# Patient Record
Sex: FEMALE | Race: WHITE | HISPANIC OR LATINO | Employment: FULL TIME | URBAN - METROPOLITAN AREA
[De-identification: names, ages, dates, MRNs, and addresses within clinical notes are randomized per-mention and may not be internally consistent; named-entity substitution may affect disease eponyms.]

---

## 2017-02-20 ENCOUNTER — APPOINTMENT (OUTPATIENT)
Dept: RADIOLOGY | Facility: MEDICAL CENTER | Age: 42
End: 2017-02-20
Attending: EMERGENCY MEDICINE

## 2017-02-20 ENCOUNTER — HOSPITAL ENCOUNTER (EMERGENCY)
Facility: MEDICAL CENTER | Age: 42
End: 2017-02-20
Attending: EMERGENCY MEDICINE
Payer: COMMERCIAL

## 2017-02-20 VITALS
HEIGHT: 63 IN | OXYGEN SATURATION: 98 % | WEIGHT: 167.55 LBS | TEMPERATURE: 97 F | RESPIRATION RATE: 16 BRPM | SYSTOLIC BLOOD PRESSURE: 139 MMHG | HEART RATE: 84 BPM | DIASTOLIC BLOOD PRESSURE: 93 MMHG | BODY MASS INDEX: 29.69 KG/M2

## 2017-02-20 DIAGNOSIS — S93.402A SPRAIN OF LEFT ANKLE, UNSPECIFIED LIGAMENT, INITIAL ENCOUNTER: ICD-10-CM

## 2017-02-20 PROCEDURE — 99026 IN-HOSPITAL ON CALL SERVICE: CPT | Performed by: INTERNAL MEDICINE

## 2017-02-20 PROCEDURE — 99284 EMERGENCY DEPT VISIT MOD MDM: CPT

## 2017-02-20 PROCEDURE — 73610 X-RAY EXAM OF ANKLE: CPT | Mod: LT

## 2017-02-20 RX ORDER — IBUPROFEN 600 MG/1
600 TABLET ORAL EVERY 6 HOURS PRN
Qty: 30 TAB | Refills: 0 | Status: SHIPPED | OUTPATIENT
Start: 2017-02-20 | End: 2024-02-01

## 2017-02-20 NOTE — ED AVS SNAPSHOT
2/20/2017          Fernando Gomes  [address] [place] [address]    Dear Ma Challenge:    [facility] wants to ensure your discharge home is safe and you or your loved ones have had all your questions answered regarding your care after you leave the hospital.    You may receive a telephone call within two days of your discharge.  This call is to make certain you understand your discharge instructions as well as ensure we provided you with the best care possible during your stay with us.     The call will only last approximately 3-5 minutes and will be done by a nurse.    Once again, we want to ensure your discharge home is safe and that you have a clear understanding of any next steps in your care.  If you have any questions or concerns, please do not hesitate to contact us, we are here for you.  Thank you for choosing [facility] for your healthcare needs.    Sincerely,    Jarett Lima    [facility]

## 2017-02-20 NOTE — ED AVS SNAPSHOT
Physicians Interactive Access Code: [email or web address]  Expires: 3/22/2017  6:03 PM    Your email address is not on file at [facility] [facility].  Email Addresses are required for you to sign up for Physicians Interactive, please contact [phone] to verify your personal information and to provide your email address prior to attempting to register for Physicians Interactive.    Fernando Gomes  [address] [place] [address]    My True Fitt  A secure, online tool to manage your health information     [employer]’s Physicians Interactive® is a secure, online tool that connects you to your personalized health information from the privacy of your home -- day or night - making it very easy for you to manage your healthcare. Once the activation process is completed, you can even access your medical information using the Physicians Interactive biju, which is available for free in the Apple Biju store or Google Play store.     To learn more about Physicians Interactive, visit [email or web address]    There are two levels of access available (as shown below):   My Chart Features  [facility] Primary Care Doctor [facility]  Specialists [facility]  Urgent  Care Non-[facility] Primary Care Doctor   Email your healthcare team securely and privately 24/7 X X X    Manage appointments: schedule your next appointment; view details of past/upcoming appointments X      Request prescription refills. X      View recent personal medical records, including lab and immunizations X X X X   View health record, including health history, allergies, medications X X X X   Read reports about your outpatient visits, procedures, consult and ER notes X X X X   See your discharge summary, which is a recap of your hospital and/or ER visit that includes your diagnosis, lab results, and care plan X X  X     How to register for My True Fitt:  Once your e-mail address has been verified, follow the following steps to sign up for My True Fitt.     1. Go to  [email or web address]  2. Click on the Sign Up Now box, which takes you to the New Member Sign Up page. You  will need to provide the following information:  a. Enter your [employer] Access Code exactly as it appears at the top of this page. (You will not need to use this code after you’ve completed the sign-up process. If you do not sign up before the expiration date, you must request a new code.)   b. Enter your date of birth.   c. Enter your home email address.   d. Click Submit, and follow the next screen’s instructions.  3. Create a [employer] Mediat ID. This will be your Xiaoi Robert login ID and cannot be changed, so think of one that is secure and easy to remember.  4. Create a Xiaoi Robert password. You can change your password at any time.  5. Enter your Password Reset Question and Answer. This can be used at a later time if you forget your password.   6. Enter your e-mail address. This allows you to receive e-mail notifications when new information is available in Xiaoi Robert.  7. Click Sign Up. You can now view your health information.    For assistance activating your Xiaoi Robert account, call [phone]

## 2017-02-20 NOTE — LETTER
"  FORM C-4:  EMPLOYEE’S CLAIM FOR COMPENSATION/ REPORT OF INITIAL TREATMENT  EMPLOYEE’S CLAIM - PROVIDE ALL INFORMATION REQUESTED   First Name  Fernando Zuñiga Last Name  David Gomes Birthdate             Age  1975 41 y.o. 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 [facility] [address]] Zip  [address]   Date of Injury  2/20/2017       Hour of Injury  3:15 PM Date Employer Notified  2/20/2017 Last Day of Work after Injury or Occupational Disease  2/20/2017 Supervisor to Whom Injury Reported  Bandar   Address or Location of Accident (if applicable)  Stairs   What were you doing at the time of accident? (if applicable)  Going down the stairs    How did this injury or occupational disease occur? Be specific and answer in detail. Use additional sheet if necessary)  Weakness on Left Foot while going down the stairs   If you believe that you have an occupational disease, when did you first have knowledge of the disability and it relationship to your employment?  NA Witnesses to the Accident  Two Ladies from Work, but dont know their names     Nature of Injury or Occupational Disease  Contusion  Part(s) of Body Injured or Affected  Ankle (L), Defer, Defer    I certify that the above is true and correct to the best of my knowledge and that I have provided this information in order to obtain the " benefits of [place]  I hereby authorize any physician, chiropractor, surgeon, practitioner, or other person, any hospital, including [facility] or [facility] hospital, any medical service organization, any insurance company, or other institution or organization to release to each other, any medical or other information, including benefits paid or payable, pertinent to this injury or disease, except information relative to diagnosis, treatment and/or counseling for AIDS, psychological conditions, alcohol or controlled substances, for which I must give specific authorization.  A Photostat of this authorization shall be as valid as the original.   Date  02/20/2017 Place  [facility] Employee’s Signature   THIS REPORT MUST BE COMPLETED AND MAILED WITHIN 3 WORKING DAYS OF TREATMENT   Place  [facility], EMERGENCY DEPT  Name of Facility   [facility]   Date  2/20/2017 Diagnosis  No diagnosis found. Is there evidence the injured employee was under the influence of alcohol and/or another controlled substance at the time of accident?   Hour  5:01 PM Description of Injury or Disease   No   Treatment  Crutches, ankle air splint, Motrin, ice  Have you advised the patient to remain off work five days or more?         No   X-Ray Findings  Negative  Comments:negative for acute fracture or dislocation   If Yes   From Date    To Date      From information given by the employee, together with medical evidence, can you directly connect this injury or occupational disease as job incurred?  Yes If No, is the employee capable of: Full Duty  No Modified Duty  Yes   Is additional medical care by a physician indicated?  Yes  Comments:Workmen's Compensation, orthopedics If Modified Duty, Specify any Limitations / Restrictions  No walking     Do you know of any previous injury or  "disease contributing to this condition or occupational disease?  No   Date  2/20/2017 Print Doctor’s Name  Abhi Damon certify the employer’s copy of this form was mailed on:   Address  [address] [address] [phone] Insurer’s Use Only   [facility]  [address]    Provider’s Tax ID Number  [id number] Telephone  Dept: [phone]    Doctor’s Signature  e-ABHI Ovalle D.O. Degree   D.O.    Original - TREATING PHYSICIAN OR CHIROPRACTOR   Pg 2-Insurer/TPA   Pg 3-Employer   Pg 4-Employee                                                                                                  Form C-4 (rev01/03)     BRIEF DESCRIPTION OF RIGHTS AND BENEFITS  (Pursuant to [place] 616C.050)    Notice of Injury or Occupational Disease (Incident Report Form C-1): If an injury or occupational disease (OD) arises out of and in the course of employment, you must provide written notice to your employer as soon as practicable, but no later than 7 days after the accident or OD. Your employer shall maintain a sufficient supply of the required forms.    Claim for Compensation (Form C-4): If medical treatment is sought, the form C-4 is available at the place of initial treatment. A completed \"Claim for Compensation\" (Form C-4) must be filed within 90 days after an accident or OD. The treating physician or chiropractor must, within 3 working days after treatment, complete and mail to the employer, the employer's insurer and third-party , the Claim for Compensation.    Medical Treatment: If you require medical treatment for your on-the-job injury or OD, you may be required to select a physician or chiropractor from a list provided by your workers’ compensation insurer, if it has contracted with an Organization for Managed Care (MCO) or Preferred Provider Organization (PPO) or providers of health care. If your employer has not entered into a contract with an MCO or PPO, you " may select a physician or chiropractor from the Panel of Physicians and Chiropractors. Any medical costs related to your industrial injury or OD will be paid by your insurer.    Temporary Total Disability (TTD): If your doctor has certified that you are unable to work for a period of at least 5 consecutive days, or 5 cumulative days in a 20-day period, or places restrictions on you that your employer does not accommodate, you may be entitled to TTD compensation.    Temporary Partial Disability (TPD): If the wage you receive upon reemployment is less than the compensation for TTD to which you are entitled, the insurer may be required to pay you TPD compensation to make up the difference. TPD can only be paid for a maximum of 24 months.    Permanent Partial Disability (PPD): When your medical condition is stable and there is an indication of a PPD as a result of your injury or OD, within 30 days, your insurer must arrange for an evaluation by a rating physician or chiropractor to determine the degree of your PPD. The amount of your PPD award depends on the date of injury, the results of the PPD evaluation and your age and wage.    Permanent Total Disability (PTD): If you are medically certified by a treating physician or chiropractor as permanently and totally disabled and have been granted a PTD status by your insurer, you are entitled to receive monthly benefits not to exceed 66 2/3% of your average monthly wage. The amount of your PTD payments is subject to reduction if you previously received a PPD award.    Vocational Rehabilitation Services: You may be eligible for vocational rehabilitation services if you are unable to return to the job due to a permanent physical impairment or permanent restrictions as a result of your injury or occupational disease.    Transportation and Per Sohan Reimbursement: You may be eligible for travel expenses and per sohan associated with medical treatment.  Reopening: You may be able  to reopen your claim if your condition worsens after claim closure.    Appeal Process: If you disagree with a written determination issued by the insurer or the insurer does not respond to your request, you may appeal to the Department of Administration, , by following the instructions contained in your determination letter. You must appeal the determination within 70 days from the date of the determination letter at [address], [address], [place], [place] [address], or [address] [facility] [facility], [place], [place] [address]. If you disagree with the  decision, you may appeal to the Department of Administration, . You must file your appeal within 30 days from the date of the  decision letter at [address], [address], [place], [place] [address], or [address] [facility], [place], [place] [address]. If you disagree with a decision of an , you may file a petition for judicial review with the District Court. You must do so within 30 days of the Appeal Officer’s decision. You may be represented by an  at your own expense or you may contact the [facility] for possible representation.    [place]  for Injured Workers (NAIW): If you disagree with a  decision, you may request that NAIW represent you without charge at an  Hearing. For information regarding denial of benefits, you may contact the [facility] at: [address], [address], [place] [address], [phone], or [address], [place] [address], [phone]    To File a Complaint with the Division: If you wish to file a complaint with the  of the Division of Industrial Relations (DIR), please contact the Workers’ Compensation Section, [address], [address], [place], [place] [address], telephone [phone]) [phone], or [address], [place]  [address], telephone [phone].    For assistance with Workers’ Compensation Issues: you may contact the Office of the Governor Consumer Health Assistance, [address] [place] [address], [address], [place] [address], Toll Free 1-587.365.4130, Web site: http://[employer].[facility].nv., E-mail [email or web address].                                                                                                                                                                               __________________________________________________________________                                    ______02/20/017_____            Employee Name / Signature                                                                                                                            Date                                       D-2 (rev. 10/07)

## 2017-02-20 NOTE — ED NOTES
Pt bib [facility]. Pt was at work when she fell down 3 steps and rolled her left ankle. No deformity noted. Pt reports pain and tenderness. Pt in NAD. VSS

## 2017-02-20 NOTE — ED AVS SNAPSHOT
Home Care Instructions                                                                                                                Fernando Gomes   MRN: [id number]    Department:  [facility], Emergency Dept   Date of Visit:  2/20/2017            [facility], Emergency Dept    [address] [address]    Phone:  [phone]      You were seen by     Tk Damon D.O.      Your Diagnosis Was     Sprain of left ankle, unspecified ligament, initial encounter     S93.402A       Follow-up Information     1. Follow up with [facility], Emergency Dept.    Specialty:  Emergency Medicine    Why:  If symptoms worsen    Contact information    [address] [address] [phone]        2. Schedule an appointment as soon as possible for a visit with Spenser Mendoza M.D..    Specialty:  Orthopaedics    Why:  As needed    Contact information    [address]  [facility] [address] [phone]          3. Follow up with [facility]. Schedule an appointment as soon as possible for a visit in 1 day.    Contact information    [address] [facility] [place] [address] [phone]        Medication Information     Review all of your home medications and newly ordered medications with your primary doctor and/or pharmacist as soon as possible. Follow medication instructions as directed by your doctor and/or pharmacist.     Please keep your complete medication list with you and share with your physician. Update the information when medications are discontinued, doses are changed, or new medications (including over-the-counter products) are added; and carry medication information at all times in the event of emergency situations.               Medication List      START taking these medications        Instructions    ibuprofen 600 MG Tabs   Commonly known as:  MOTRIN    Take 1 Tab by mouth every 6 hours as needed for Mild Pain.      Dose:  600 mg               Procedures and tests performed during your visit     DX-ANKLE 3+ VIEWS LEFT        Discharge Instructions       Esguince de tobillo  (Ankle Sprain)   Un esguince de tobillo es fiordaliza lesión en los tejidos shyanne y fibrosos (ligamentos) que mantienen unidos los huesos de la articulación del tobillo.   CAUSAS   Las causas pueden ser fiordaliza caída o la torcedura del tobillo. Los esguinces de tobillo ocurren con más frecuencia al pisar con el borde exterior del pie, lo que hace que el tobillo se vuelva hacia adentro. Las personas que practican deportes son más propensas a ely tipo de lesiones.   SÍNTOMAS   · Dolor en el tobillo. El dolor puede aparecer bill el reposo o sólo al tratar de ponerse de pie o caminar.  · Hinchazón.  · Hematomas. Los hematomas pueden aparecer inmediatamente o luego de 1 a 2 días después de la lesión.  · Dificultad para pararse o caminar, especialmente al doblar en esquinas o al cambiar de dirección.  DIAGNÓSTICO   El médico le preguntará detalles acerca de la lesión y le hará un examen físico del tobillo para determinar si tiene un esguince. Bill el examen físico, el médico apretará y aplicará presión en áreas específicas del pie y del tobillo. El médico tratará de  el tobillo en ciertas direcciones. Le indicarán fiordaliza radiografía para descartar la fractura de un hueso o que un ligamento no se haya separado de brice de los huesos del tobillo (fractura por avulsión).   TRATAMIENTO   Algunos tipos de soporte podrán ayudarlo a estabilizar el tobillo. El profesional que lo asiste le dará las indicaciones. También podrá indicarle que use medicamentos para calmar el dolor. Si el esguince es grave, novoa médico podrá derivarlo a un cirujano que lo ayudará a recuperar la función de las partes afectadas del sistema esquelético (ortopedista) o a un fisioterapeuta.   INSTRUCCIONES PARA EL CUIDADO EN EL HOGAR   · Aplique hielo en la articulación lesionada bill 1 ó 2 días o  según lo que le indique novoa médico. La aplicación del hielo ayuda a reducir la inflamación y el dolor.  ¨ Ponga el hielo en fiordaliza bolsa plástica.  ¨ Colóquese fiordaliza toalla entre la piel y la bolsa de hielo.  ¨ Deje el hielo en el lugar bill 15 a 20 minutos por vez, cada 2 horas mientras esté despierto.  · Sólo tome medicamentos de venta dixie o recetados para calmar el dolor, las molestias o bajar la fiebre según las indicaciones de novoa médico.  · Eleve el tobillo lesionado por encima del nivel del corazón tanto alissa pueda bill 2 o 3 días.  · Si novoa médico le indica el uso de muletas, úselas según las instrucciones. Gradualmente lleve el peso sobre el tobillo afectado. Siga usando muletas o un bastón hasta que pueda caminar sin sentir dolor en el tobillo.  · Si tiene fiordaliza férula de yeso, úsela alissa lo indique novoa médico. No se apoye en ninguna cosa más dura que fiordaliza almohada bill las primeras 24 horas. No ponga peso sobre la férula. No permita que se moje. Puede quitársela para sue fiordaliza ducha o un baño.  · Pueden haberle colocado un vendaje elástico para usar alrededor del tobillo para darle soporte. Si el vendaje elástico está muy ajustado (siente adormecimiento u hormigueo o el pie está frío y alex), ajústelo para que sea más cómodo.  · Si usted tiene fiordaliza férula de aire, puede soplar o dejar salir el aire para que sea más cómodo. Puede quitarse la férula por la noche y antes de sue fiordaliza ducha o un baño. Mueva los dedos de los pies en la férula varias veces al día para disminuir la hinchazón.  SOLICITE ATENCIÓN MÉDICA SI:   · Le aumenta rápidamente el moretón o el hinchazón.  · Los dedos de los pies están extremadamente fríos o pierde la sensibilidad en el pie.  · El dolor no se silvino con los medicamentos.  SOLICITE ATENCIÓN MÉDICA DE INMEDIATO SI:   · Los dedos de los pies están adormecidos o de color alex.  · Tiene un dolor pema que va aumentando.  ASEGÚRESE DE QUE:   · Comprende estas  instrucciones.  · Controlará novoa enfermedad.  · Solicitará ayuda de inmediato si no mejora o empeora.     Esta información no tiene alissa fin reemplazar el consejo del médico. Asegúrese de hacerle al médico cualquier pregunta que tenga.     Document Released: 12/18/2006 Document Revised: 09/11/2013  [employer] Interactive Patient Education ©2016 [employer] Inc.            Patient Information     Patient Information    Following emergency treatment: all patient requiring follow-up care must return either to a private physician or a clinic if your condition worsens before you are able to obtain further medical attention, please return to the emergency room.     Billing Information    At [facility], we work to make the billing process streamlined for our patients.  Our Representatives are here to answer any questions you may have regarding your hospital bill.  If you have insurance coverage and have supplied your insurance information to us, we will submit a claim to your insurer on your behalf.  Should you have any questions regarding your bill, we can be reached online or by phone as follows:  Online: You are able pay your bills online or live chat with our representatives about any billing questions you may have. We are here to help Monday - Friday from 8:00am to 7:30pm and 9:00am - 12:00pm on Saturdays.  Please visit [email or web address]  for more information.   Phone:  [phone] or [phone]    Please note that your emergency physician, surgeon, pathologist, radiologist, anesthesiologist, and other specialists are not employed by [facility] and will therefore bill separately for their services.  Please contact them directly for any questions concerning their bills at the numbers below:     Emergency Physician Services:  [phone]  [facility]:  [phone]  [facility]:  [phone]  [facility]:  [phone]    1. Your final  bill may vary from the amount quoted upon discharge if all procedures are not complete at that time, or if your doctor has additional procedures of which we are not aware. You will receive an additional bill if you return to the Emergency Department at [facility] for suture removal regardless of the facility of which the sutures were placed.     2. Please arrange for settlement of this account at the emergency registration.    3. All self-pay accounts are due in full at the time of treatment.  If you are unable to meet this obligation then payment is expected within 4-5 days.     4. If you have had radiology studies (CT, X-ray, Ultrasound, MRI), you have received a preliminary result during your emergency department visit. Please contact the radiology department [phone] to receive a copy of your final result. Please discuss the Final result with your primary physician or with the follow up physician provided.     Crisis Hotline:  [place] Crisis Hotline:  5-248-ZWUOFWY or 1-715.817.6971  [place] Crisis Hotline:    1-360.719.1927 or 018-834-9805         ED Discharge Follow Up Questions    1. In order to provide you with very good care, we would like to follow up with a phone call in the next few days.  May we have your permission to contact you?     YES /  NO    2. What is the best phone number to call you? (       )_____-__________    3. What is the best time to call you?      Morning  /  Afternoon  /  Evening                   Patient Signature:  ____________________________________________________________    Date:  ____________________________________________________________

## 2017-02-21 ENCOUNTER — NON-PROVIDER VISIT (OUTPATIENT)
Dept: OCCUPATIONAL MEDICINE | Facility: CLINIC | Age: 42
End: 2017-02-21
Payer: COMMERCIAL

## 2017-02-21 ENCOUNTER — OCCUPATIONAL MEDICINE (OUTPATIENT)
Dept: OCCUPATIONAL MEDICINE | Facility: CLINIC | Age: 42
End: 2017-02-21
Payer: COMMERCIAL

## 2017-02-21 VITALS
TEMPERATURE: 97 F | SYSTOLIC BLOOD PRESSURE: 130 MMHG | RESPIRATION RATE: 16 BRPM | DIASTOLIC BLOOD PRESSURE: 84 MMHG | BODY MASS INDEX: 28.6 KG/M2 | WEIGHT: 167.55 LBS | OXYGEN SATURATION: 97 % | HEIGHT: 64 IN | HEART RATE: 77 BPM

## 2017-02-21 DIAGNOSIS — S93.402D SPRAIN OF LEFT ANKLE, UNSPECIFIED LIGAMENT, SUBSEQUENT ENCOUNTER: ICD-10-CM

## 2017-02-21 DIAGNOSIS — Z02.1 PRE-EMPLOYMENT DRUG SCREENING: ICD-10-CM

## 2017-02-21 DIAGNOSIS — Z02.83 ENCOUNTER FOR DRUG SCREENING: ICD-10-CM

## 2017-02-21 PROCEDURE — 80305 DRUG TEST PRSMV DIR OPT OBS: CPT | Performed by: INTERNAL MEDICINE

## 2017-02-21 PROCEDURE — 82075 ASSAY OF BREATH ETHANOL: CPT | Performed by: INTERNAL MEDICINE

## 2017-02-21 PROCEDURE — 99201 PR OFFICE/OUTPT VISIT,NEW,LEVL I: CPT | Performed by: PREVENTIVE MEDICINE

## 2017-02-21 NOTE — MR AVS SNAPSHOT
"        Fernando Gomes   2017 4:10 PM   Occupational Medicine   MRN: [id number]    Department:  [facility]   Dept Phone:  [phone]    Description:  Female : 1975   Provider:  Benigno Garcia M.D.           Reason for Visit     Follow-Up WC DOI 17 L ankle same RM 25      Allergies as of 2017     No Known Allergies      You were diagnosed with     Sprain of left ankle, unspecified ligament, subsequent encounter   [[id number]]         Vital Signs     Blood Pressure Pulse Temperature Respirations Height Weight    130/84 mmHg 77 36.1 °C (97 °F) 16 1.62 m (5' 3.78\") 76 kg (167 lb 8.8 oz)    Body Mass Index Oxygen Saturation Last Menstrual Period Breastfeeding? Smoking Status       28.96 kg/m2 97% 2017 (Exact Date) No Never Smoker        Basic Information     Date Of Birth Sex Race Ethnicity Preferred Language Language for Written Material    1975 Female White  Origin (Turkmen,Macedonian,Serbian,Kofi, etc) English Turkmen      Your appointments     2017  3:40 PM   Workers Compensation with Benigno Garcia M.D.   [address]  [address] [place] [address] [phone]              Health Maintenance     Patient has no pending health maintenance at this time      Current Immunizations     No immunizations on file.      Below and/or attached are the medications your provider expects you to take. Review all of your home medications and newly ordered medications with your provider and/or pharmacist. Follow medication instructions as directed by your provider and/or pharmacist. Please keep your medication list with you and share with your provider. Update the information when medications are discontinued, doses are changed, or new medications (including over-the-counter products) are added; and carry medication information at all times in the event of emergency situations     Allergies:  No Known Allergies "          Medications  Valid as of: February 21, 2017 -  4:19 PM    Generic Name Brand Name Tablet Size Instructions for use    Ibuprofen (Tab) MOTRIN 600 MG Take 1 Tab by mouth every 6 hours as needed for Mild Pain.        Ibuprofen (Tab) MOTRIN 600 MG Take 1 Tab by mouth every 6 hours as needed.        .                 Medicines prescribed today were sent to:     [facility][id number] - [place], [place] - [address]    [address] [place] [address]    Phone: [phone] Fax: [phone]    Open 24 Hours?: No      Medication refill instructions:       If your prescription bottle indicates you have medication refills left, it is not necessary to call your provider’s office. Please contact your pharmacy and they will refill your medication.    If your prescription bottle indicates you do not have any refills left, you may request refills at any time through one of the following ways: The online AngioSlide system (except Urgent Care), by calling your provider’s office, or by asking your pharmacy to contact your provider’s office with a refill request. Medication refills are processed only during regular business hours and may not be available until the next business day. Your provider may request additional information or to have a follow-up visit with you prior to refilling your medication.   *Please Note: Medication refills are assigned a new Rx number when refilled electronically. Your pharmacy may indicate that no refills were authorized even though a new prescription for the same medication is available at the pharmacy. Please request the medicine by name with the pharmacy before contacting your provider for a refill.           AngioSlide Access Code: [email or web address]  Expires: 3/22/2017  6:03 PM    AngioSlide  A secure, online tool to manage your health information     [employer]’s AngioSlide® is a secure, online tool that connects you to your personalized health information from the privacy of your home -- day or night -  making it very easy for you to manage your healthcare. Once the activation process is completed, you can even access your medical information using the [employer] biju, which is available for free in the Apple Biju store or Google Play store.     [employer] provides the following levels of access (as shown below):   My Chart Features   [facility] Primary Care Doctor [facility]  Specialists [facility]  Urgent  Care Non-[facility]  Primary Care  Doctor   Email your healthcare team securely and privately 24/7 X X X    Manage appointments: schedule your next appointment; view details of past/upcoming appointments X      Request prescription refills. X      View recent personal medical records, including lab and immunizations X X X X   View health record, including health history, allergies, medications X X X X   Read reports about your outpatient visits, procedures, consult and ER notes X X X X   See your discharge summary, which is a recap of your hospital and/or ER visit that includes your diagnosis, lab results, and care plan. X X       How to register for [employer]:  1. Go to  [email or web address].  2. Click on the Sign Up Now box, which takes you to the New Member Sign Up page. You will need to provide the following information:  a. Enter your [employer] Access Code exactly as it appears at the top of this page. (You will not need to use this code after you’ve completed the sign-up process. If you do not sign up before the expiration date, you must request a new code.)   b. Enter your date of birth.   c. Enter your home email address.   d. Click Submit, and follow the next screen’s instructions.  3. Create a [employer] ID. This will be your [employer] login ID and cannot be changed, so think of one that is secure and easy to remember.  4. Create a [employer] password. You can change your password at any time.  5. Enter your Password Reset Question and Answer. This can be used at a later time if you forget your password.   6. Enter your e-mail  address. This allows you to receive e-mail notifications when new information is available in [employer].  7. Click Sign Up. You can now view your health information.    For assistance activating your [employer] account, call [phone]

## 2017-02-21 NOTE — DISCHARGE INSTRUCTIONS
Esguince de tobillo  (Ankle Sprain)   Un esguince de tobillo es fiordaliza lesión en los tejidos shyanne y fibrosos (ligamentos) que mantienen unidos los huesos de la articulación del tobillo.   CAUSAS   Las causas pueden ser fiordaliza caída o la torcedura del tobillo. Los esguinces de tobillo ocurren con más frecuencia al pisar con el borde exterior del pie, lo que hace que el tobillo se vuelva hacia adentro. Las personas que practican deportes son más propensas a ely tipo de lesiones.   SÍNTOMAS   · Dolor en el tobillo. El dolor puede aparecer bill el reposo o sólo al tratar de ponerse de pie o caminar.  · Hinchazón.  · Hematomas. Los hematomas pueden aparecer inmediatamente o luego de 1 a 2 días después de la lesión.  · Dificultad para pararse o caminar, especialmente al doblar en esquinas o al cambiar de dirección.  DIAGNÓSTICO   El médico le preguntará detalles acerca de la lesión y le hará un examen físico del tobillo para determinar si tiene un esguince. Bill el examen físico, el médico apretará y aplicará presión en áreas específicas del pie y del tobillo. El médico tratará de  el tobillo en ciertas direcciones. Le indicarán fiordaliza radiografía para descartar la fractura de un hueso o que un ligamento no se haya separado de brice de los huesos del tobillo (fractura por avulsión).   TRATAMIENTO   Algunos tipos de soporte podrán ayudarlo a estabilizar el tobillo. El profesional que lo asiste le dará las indicaciones. También podrá indicarle que use medicamentos para calmar el dolor. Si el esguince es grave, novoa médico podrá derivarlo a un cirujano que lo ayudará a recuperar la función de las partes afectadas del sistema esquelético (ortopedista) o a un fisioterapeuta.   INSTRUCCIONES PARA EL CUIDADO EN EL HOGAR   · Aplique hielo en la articulación lesionada bill 1 ó 2 días o según lo que le indique novoa médico. La aplicación del hielo ayuda a reducir la inflamación y el dolor.  ¨ Ponga el hielo en fiordaliza bolsa  plástica.  ¨ Colóquese fiordaliza toalla entre la piel y la bolsa de hielo.  ¨ Deje el hielo en el lugar bill 15 a 20 minutos por vez, cada 2 horas mientras esté despierto.  · Sólo tome medicamentos de venta dixie o recetados para calmar el dolor, las molestias o bajar la fiebre según las indicaciones de novoa médico.  · Eleve el tobillo lesionado por encima del nivel del corazón tanto alissa pueda bill 2 o 3 días.  · Si novoa médico le indica el uso de muletas, úselas según las instrucciones. Gradualmente lleve el peso sobre el tobillo afectado. Siga usando muletas o un bastón hasta que pueda caminar sin sentir dolor en el tobillo.  · Si tiene fiordaliza férula de yeso, úsela alissa lo indique novoa médico. No se apoye en ninguna cosa más dura que fiordaliza almohada bill las primeras 24 horas. No ponga peso sobre la férula. No permita que se moje. Puede quitársela para sue fiordaliza ducha o un baño.  · Pueden haberle colocado un vendaje elástico para usar alrededor del tobillo para darle soporte. Si el vendaje elástico está muy ajustado (siente adormecimiento u hormigueo o el pie está frío y alex), ajústelo para que sea más cómodo.  · Si usted tiene fiordaliza férula de aire, puede soplar o dejar salir el aire para que sea más cómodo. Puede quitarse la férula por la noche y antes de sue fiordaliza ducha o un baño. Mueva los dedos de los pies en la férula varias veces al día para disminuir la hinchazón.  SOLICITE ATENCIÓN MÉDICA SI:   · Le aumenta rápidamente el moretón o el hinchazón.  · Los dedos de los pies están extremadamente fríos o pierde la sensibilidad en el pie.  · El dolor no se silvino con los medicamentos.  SOLICITE ATENCIÓN MÉDICA DE INMEDIATO SI:   · Los dedos de los pies están adormecidos o de color alex.  · Tiene un dolor pema que va aumentando.  ASEGÚRESE DE QUE:   · Comprende estas instrucciones.  · Controlará novoa enfermedad.  · Solicitará ayuda de inmediato si no mejora o empeora.     Esta información no tiene alissa fin reemplazar el  consejo del médico. Asegúrese de hacerle al médico cualquier pregunta que tenga.     Document Released: 12/18/2006 Document Revised: 09/11/2013  Elsevier Interactive Patient Education ©2016 Elsevier Inc.

## 2017-02-21 NOTE — MR AVS SNAPSHOT
Ma Zara Gomes   2017 8:30 AM   Appointment   MRN: [id number]    Department:  [facility]   Dept Phone:  [phone]    Description:  Female : 1975   Provider:  OH NON RENOWN MA           Allergies as of 2017     No Known Allergies      Vital Signs     Last Menstrual Period Smoking Status                2017 (Exact Date) Never Smoker           Basic Information     Date Of Birth Sex Race Ethnicity Preferred Language Language for Written Material    1975 Female White  Origin (Irish,Afghan,South Korean,Ethiopian, etc) English Irish      Your appointments     2017  3:40 PM   Workers Compensation with Benigno Garcia M.D.   [facility] ([address]  [facility] [address] [phone]              Health Maintenance        Date Due Completion Dates    IMM DTaP/Tdap/Td Vaccine (1 - Tdap) 1995 ---    PAP SMEAR 1997 ---    MAMMOGRAM 2016 ---    IMM INFLUENZA (1) 2016 ---            Current Immunizations     No immunizations on file.      Below and/or attached are the medications your provider expects you to take. Review all of your home medications and newly ordered medications with your provider and/or pharmacist. Follow medication instructions as directed by your provider and/or pharmacist. Please keep your medication list with you and share with your provider. Update the information when medications are discontinued, doses are changed, or new medications (including over-the-counter products) are added; and carry medication information at all times in the event of emergency situations     Allergies:  No Known Allergies          Medications  Valid as of: 2017 -  2:12 PM    Generic Name Brand Name Tablet Size Instructions for use    Ibuprofen (Tab) MOTRIN 600 MG Take 1 Tab by mouth every 6 hours as needed for Mild Pain.        Ibuprofen (Tab) MOTRIN 600 MG Take 1 Tab by mouth  every 6 hours as needed.        .                 Medicines prescribed today were sent to:     [facility]/PHARMACY #[id number] - [place], [place] - [address]    [address] [facility] [place] [address]    Phone: [phone] Fax: [phone]    Open 24 Hours?: No      Medication refill instructions:       If your prescription bottle indicates you have medication refills left, it is not necessary to call your provider’s office. Please contact your pharmacy and they will refill your medication.    If your prescription bottle indicates you do not have any refills left, you may request refills at any time through one of the following ways: The online Strategic Blue system (except Urgent Care), by calling your provider’s office, or by asking your pharmacy to contact your provider’s office with a refill request. Medication refills are processed only during regular business hours and may not be available until the next business day. Your provider may request additional information or to have a follow-up visit with you prior to refilling your medication.   *Please Note: Medication refills are assigned a new Rx number when refilled electronically. Your pharmacy may indicate that no refills were authorized even though a new prescription for the same medication is available at the pharmacy. Please request the medicine by name with the pharmacy before contacting your provider for a refill.           Strategic Blue Access Code: [email or web address]  Expires: 3/22/2017  6:03 PM    Strategic Blue  A secure, online tool to manage your health information     [employer]’s Strategic Blue® is a secure, online tool that connects you to your personalized health information from the privacy of your home -- day or night - making it very easy for you to manage your healthcare. Once the activation process is completed, you can even access your medical information using the Strategic Blue biju, which is available for free in the Apple Biju store or Google Play store.     Strategic Blue provides the following  levels of access (as shown below):   My Chart Features   [facility] Primary Care Doctor [facility]  Specialists [facility]  Urgent  Care Non-[facility]  Primary Care  Doctor   Email your healthcare team securely and privately 24/7 X X X    Manage appointments: schedule your next appointment; view details of past/upcoming appointments X      Request prescription refills. X      View recent personal medical records, including lab and immunizations X X X X   View health record, including health history, allergies, medications X X X X   Read reports about your outpatient visits, procedures, consult and ER notes X X X X   See your discharge summary, which is a recap of your hospital and/or ER visit that includes your diagnosis, lab results, and care plan. X X       How to register for Efizity:  1. Go to  [email or web address].  2. Click on the Sign Up Now box, which takes you to the New Member Sign Up page. You will need to provide the following information:  a. Enter your Efizity Access Code exactly as it appears at the top of this page. (You will not need to use this code after you’ve completed the sign-up process. If you do not sign up before the expiration date, you must request a new code.)   b. Enter your date of birth.   c. Enter your home email address.   d. Click Submit, and follow the next screen’s instructions.  3. Create a Efizity ID. This will be your Efizity login ID and cannot be changed, so think of one that is secure and easy to remember.  4. Create a Efizity password. You can change your password at any time.  5. Enter your Password Reset Question and Answer. This can be used at a later time if you forget your password.   6. Enter your e-mail address. This allows you to receive e-mail notifications when new information is available in Efizity.  7. Click Sign Up. You can now view your health information.    For assistance activating your Efizity account, call [phone]

## 2017-02-21 NOTE — Clinical Note
[address],   [address] [place] [address]  Phone: [phone] - Fax: [phone]        [facility] Progress Report and Disability Certification  Date of Service: 2/21/2017   No Show:  No  Date / Time of Next Visit: 2/27/2017@3:40PM    Claim Information   Patient Name: Fernando Gomes  Claim Number:     Employer: [employer]  Date of Injury: 2/20/2017     Insurer / TPA: Workers Choice  ID / SSN:     Occupation: Vital  Diagnosis: The encounter diagnosis was Sprain of left ankle, unspecified ligament, subsequent encounter.    Medical Information   Related to Industrial Injury? No  Comments:Indeterminate-no hazard identified, will await final determination from insurance    Subjective Complaints:  DOI 2/20/2017. Mechanism of injury-slipped on stairs. 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 Worker seen initially in emergency department yesterday where x-rays are negative.   Objective Findings: Appearance: Well-developed, well-nourished. With walker and crutches  Mental Status: Pleasant. Cooperative. Appropriate.   Musculoskeletal: Left ankle exam shows mild swelling and ecchymosis. Distal neurovascular is intact.   Pre-Existing Condition(s):     Assessment:   Condition Same    Status: Additional Care Required  Permanent Disability:No    Plan: Medication    Diagnostics:      Comments:       Disability Information   Status: Released to Restricted Duty    From:  2/21/2017  Through: 2/27/2017 Restrictions are: Temporary   Physical Restrictions   Sitting:    Standing:  < or = to 1 hr/day Stooping:    Bending:      Squatting:    Walking:  < or = to 1 hr/day Climbing:    Pushing:      Pulling:    Other:    Reaching Above Shoulder (L):   Reaching Above Shoulder (R):       Reaching Below Shoulder (L):    Reaching Below Shoulder (R):      Not to exceed Weight Limits   Carrying(hrs):   Weight Limit(lb):    Lifting(hrs):   Weight  Limit(lb):     Comments: See down mostly    Repetitive Actions   Hands: i.e. Fine Manipulations from Grasping:     Feet: i.e. Operating Foot Controls:     Driving / Operate Machinery:     Physician Name: Benigno Garcia M.D. Physician Signature: BENIGNO Parikh M.D. e-Signature: Dr. Francisco Diop, Medical Director   Clinic Name / Location: [address],   [address] [place] [address] Clinic Phone Number: Dept: [phone]   Appointment Time: 4:10 Pm Visit Start Time: 3:55 PM   Check-In Time:  3:47 Pm Visit Discharge Time:  @4:18PM   Original-Treating Physician or Chiropractor    Page 2-Insurer/TPA    Page 3-Employer    Page 4-Employee

## 2017-02-21 NOTE — ED PROVIDER NOTES
"ED Provider Note    Scribed for Tk Damon D.O. by Rosalina Garnett. 2/20/2017  4:00 PM    Primary care provider: Patient states none   Means of arrival: Private vehicle  History obtained from: Patient  History limited by: None    CHIEF COMPLAINT  Chief Complaint   Patient presents with   • T-5000 Ankle Injury     HPI  Rika Hernandez is a 41 y.o. female who presents to the Emergency Department status post left ankle injury that occurred today while she was at work, missed three steps and fell, rolled her ankle. She reports moderate ankle pain that is exacerbated with ambulation and has been constant since the fall. She denies any head injury, hip pain, arm pain, back pain or knee pain. The patient denies any right ankle symptoms. The patient denies any chronic medical history.     REVIEW OF SYSTEMS  Pertinent positives include ankle injury while at work, fall. Pertinent negatives include no head injury, hip pain, arm pain, back pain, knee pain, right ankle symptoms.      PAST MEDICAL HISTORY  History reviewed. No pertinent past medical history.    SURGICAL HISTORY  History reviewed. No pertinent past surgical history.     SOCIAL HISTORY  Social History   Substance Use Topics   • Smoking status: Never Smoker    • Smokeless tobacco: None   • Alcohol Use: No      History   Drug Use No     FAMILY HISTORY  History reviewed. No pertinent family history.    CURRENT MEDICATIONS  Home Medications     Reviewed by Genoveva Acevedo R.N. (Registered Nurse) on 02/20/17 at 1555  Med List Status: Complete    Medication Last Dose Status          Patient Gt Taking any Medications                      ALLERGIES  No Known Allergies    PHYSICAL EXAM  VITAL SIGNS: /76 mmHg  Pulse 71  Temp(Src) 36.1 °C (97 °F)  Resp 16  Ht 1.61 m (5' 3.39\")  Wt 76 kg (167 lb 8.8 oz)  BMI 29.32 kg/m2  SpO2 98%  LMP 02/16/2017 (Exact Date)  Constitutional: Well developed, Well nourished, No acute distress, Non-toxic appearance. "   HENT: Normocephalic, Atraumatic  Eyes: PERRLA, EOMI, Conjunctiva normal, No discharge.   Skin: Warm, Dry, No erythema, No rash.   Back: No thoracic or lumbar spinetenderness, No CVA tenderness.   Extremities: No edema, No evidence of deformity, Full range of motion, Significant tenderness left lateral malleolus with swelling noted, no femoral head tenderness, no right ankle tenderness  Neurologic: Alert & oriented x 3, plantar flexion and dorsiflexion 5/5 bilaterally  No focal deficits noted. Sensation and strength intact to bilateral lower extremities.      DIAGNOSTIC STUDIES/PROCEDURES    RADIOLOGY  DX-ANKLE 3+ VIEWS LEFT   Final Result      No evidence of acute fracture or dislocation.      Ossific density adjacent to the medial malleolus may be related to prior injury.      Calcaneal spurring.      The radiologist's interpretation of all radiological studies have been reviewed by me.    COURSE & MEDICAL DECISION MAKING  Nursing notes, VS, PMSFHx reviewed in chart.    4:00 PM - Patient seen and examined at bedside.  Ordered ankle x-ray to evaluate her symptoms.     4:35 PM - Re-examined; The patient is resting in bed with the ankle air splint  in place. I discussed her above findings were overall unremarkable for fracture and plans for discharge with crutches. I advised on the use of ibuprofen and ice for pain and swelling. She was given a referral to workmans compensation and instructed to return to the ED if her symptoms worsen including more pain. Patient understands and agrees.     This is a [facility] 41 y.o. female that presents with ground-level fall resulting in left ankle strain/sprain contusion. X-rays completed as is concern for possible fracture. X-rays negative for acute abnormality. The patient is neurovascularly intact. She was placed in a ankle air splint as positive neurovascular prior post-splint application. She received crutches. The patient be following up with Workmen's Compensation tomorrow  for reevaluation.     The patient's blood pressure was found to be elevated and for this reason was informed to follow-up with their primary care physician for reevaluation.     DISPOSITION:  Patient will be discharged home in stable condition.    FOLLOW UP:  [facility], Emergency Dept  [address]  [place] [id number] [phone]    If symptoms worsen    Spenser Mendoza M.D.  [address]  [facility] [address]  [phone]    Schedule an appointment as soon as possible for a visit  As needed    [facility]  [address] [facility] [address]  [phone]    Schedule an appointment as soon as possible for a visit in 1 day        OUTPATIENT MEDICATIONS:  Discharge Medication List as of 2/20/2017  6:03 PM      START taking these medications    Details   ibuprofen (MOTRIN) 600 MG Tab Take 1 Tab by mouth every 6 hours as needed for Mild Pain., Disp-30 Tab, R-0, Print Rx Paper           FINAL IMPRESSION  1. Sprain of left ankle, unspecified ligament, initial encounter          Rosalina QUILES (Ramonibjessika), am scribing for, and in the presence of, Tk Damon D.O.    Electronically signed by: Rosalina Garnett (Gisele), 2/20/2017    ITk D.O. personally performed the services described in this documentation, as scribed by Rosalina Garnett in my presence, and it is both accurate and complete.    The note accurately reflects work and decisions made by me.  Tk Damon  2/20/2017  10:32 PM

## 2017-02-22 NOTE — PROGRESS NOTES
"Subjective:      Fernando Gomes is a 41 y.o. female who presents with Follow-Up      DOI 2/20/2017. Mechanism of injury-slipped on stairs. 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 Worker seen initially in emergency department yesterday where x-rays are negative.     HPI    ROS       Objective:     /84 mmHg  Pulse 77  Temp(Src) 36.1 °C (97 °F)  Resp 16  Ht 1.62 m (5' 3.78\")  Wt 76 kg (167 lb 8.8 oz)  BMI 28.96 kg/m2  SpO2 97%  LMP 02/16/2017 (Exact Date)  Breastfeeding? No     Physical Exam    Appearance: Well-developed, well-nourished. With walker and crutches  Mental Status: Pleasant. Cooperative. Appropriate.   Musculoskeletal: Left ankle exam shows mild swelling and ecchymosis. Distal neurovascular is intact.       Assessment/Plan:     1. Sprain of left ankle, unspecified ligament, subsequent encounter  Ongoing  Restricted work/activity  Recheck in 5-7 days        "

## 2017-02-24 LAB
AMP AMPHETAMINE: NORMAL
BREATH ALCOHOL COMMENT: NORMAL
COC COCAINE: NORMAL
INT CON NEG: NORMAL
INT CON POS: NORMAL
MET METHAMPHETAMINES: NORMAL
OPI OPIATES: NORMAL
PCP PHENCYCLIDINE: NORMAL
POC BREATHALIZER: 0 PERCENT (ref 0–0.01)
POC DRUG COMMENT 753798-OCCUPATIONAL HEALTH: NEGATIVE
THC: NORMAL

## 2017-02-24 NOTE — PROGRESS NOTES
Abram JOLLEY was called out after business hours to ER for a Post Accident UDS and BAT on 2/20/17 for [facility].    UDS collected at 5:45pm.  BAT collected at 5:32pm.

## 2017-02-27 ENCOUNTER — OCCUPATIONAL MEDICINE (OUTPATIENT)
Dept: OCCUPATIONAL MEDICINE | Facility: CLINIC | Age: 42
End: 2017-02-27

## 2017-02-27 VITALS
DIASTOLIC BLOOD PRESSURE: 98 MMHG | WEIGHT: 167 LBS | BODY MASS INDEX: 29.59 KG/M2 | HEIGHT: 63 IN | HEART RATE: 77 BPM | SYSTOLIC BLOOD PRESSURE: 148 MMHG | RESPIRATION RATE: 16 BRPM | TEMPERATURE: 97.3 F | OXYGEN SATURATION: 96 %

## 2017-02-27 DIAGNOSIS — S93.402D SPRAIN OF LEFT ANKLE, UNSPECIFIED LIGAMENT, SUBSEQUENT ENCOUNTER: ICD-10-CM

## 2017-02-27 PROCEDURE — 99213 OFFICE O/P EST LOW 20 MIN: CPT | Performed by: PREVENTIVE MEDICINE

## 2017-02-27 NOTE — MR AVS SNAPSHOT
"        Fernando Gomes   2017 3:40 PM   Occupational Medicine   MRN: [id number]    Department:  [facility]   Dept Phone:  [phone]    Description:  Female : 1975   Provider:  Benigno Garcia M.D.           Reason for Visit     Follow-Up DOI 2017 Left ankle feeling better      Allergies as of 2017     No Known Allergies      You were diagnosed with     Sprain of left ankle, unspecified ligament, subsequent encounter   [[id number]]         Vital Signs     Blood Pressure Pulse Temperature Respirations Height Weight    148/98 mmHg 77 36.3 °C (97.3 °F) 16 1.6 m (5' 3\") 75.751 kg (167 lb)    Body Mass Index Oxygen Saturation Last Menstrual Period Smoking Status          29.59 kg/m2 96% 2017 (Exact Date) Never Smoker         Basic Information     Date Of Birth Sex Race Ethnicity Preferred Language Language for Written Material    1975 Female White  Origin (Chinese,Scottish,Australian,Kofi, etc) English Chinese      Health Maintenance        Date Due Completion Dates    IMM DTaP/Tdap/Td Vaccine (1 - Tdap) 1995 ---    PAP SMEAR 1997 ---    MAMMOGRAM 2016 ---    IMM INFLUENZA (1) 2016 ---            Current Immunizations     No immunizations on file.      Below and/or attached are the medications your provider expects you to take. Review all of your home medications and newly ordered medications with your provider and/or pharmacist. Follow medication instructions as directed by your provider and/or pharmacist. Please keep your medication list with you and share with your provider. Update the information when medications are discontinued, doses are changed, or new medications (including over-the-counter products) are added; and carry medication information at all times in the event of emergency situations     Allergies:  No Known Allergies          Medications  Valid as of: 2017 -  4:05 PM    Generic Name Brand Name Tablet Size " Instructions for use    Ibuprofen (Tab) MOTRIN 600 MG Take 1 Tab by mouth every 6 hours as needed for Mild Pain.        Ibuprofen (Tab) MOTRIN 600 MG Take 1 Tab by mouth every 6 hours as needed.        .                 Medicines prescribed today were sent to:     [facility]/PHARMACY #[id number] - [place], [place] - [address] [facility]    [address]  [place] [address]    Phone: [phone] Fax: [phone]    Open 24 Hours?: No      Medication refill instructions:       If your prescription bottle indicates you have medication refills left, it is not necessary to call your provider’s office. Please contact your pharmacy and they will refill your medication.    If your prescription bottle indicates you do not have any refills left, you may request refills at any time through one of the following ways: The online Genetics Squared system (except Urgent Care), by calling your provider’s office, or by asking your pharmacy to contact your provider’s office with a refill request. Medication refills are processed only during regular business hours and may not be available until the next business day. Your provider may request additional information or to have a follow-up visit with you prior to refilling your medication.   *Please Note: Medication refills are assigned a new Rx number when refilled electronically. Your pharmacy may indicate that no refills were authorized even though a new prescription for the same medication is available at the pharmacy. Please request the medicine by name with the pharmacy before contacting your provider for a refill.           Genetics Squared Access Code: [email or web address]  Expires: 3/22/2017  6:03 PM    Genetics Squared  A secure, online tool to manage your health information     [employer]’s Genetics Squared® is a secure, online tool that connects you to your personalized health information from the privacy of your home -- day or night - making it very easy for you to manage your healthcare. Once the activation process is completed, you  can even access your medical information using the Elepath biju, which is available for free in the Apple Biju store or Google Play store.     Elepath provides the following levels of access (as shown below):   My Chart Features   [facility] Primary Care Doctor [facility]  Specialists [facility]  Urgent  Care Non-[facility]  Primary Care  Doctor   Email your healthcare team securely and privately 24/7 X X X    Manage appointments: schedule your next appointment; view details of past/upcoming appointments X      Request prescription refills. X      View recent personal medical records, including lab and immunizations X X X X   View health record, including health history, allergies, medications X X X X   Read reports about your outpatient visits, procedures, consult and ER notes X X X X   See your discharge summary, which is a recap of your hospital and/or ER visit that includes your diagnosis, lab results, and care plan. X X       How to register for Elepath:  1. Go to  [email or web address].  2. Click on the Sign Up Now box, which takes you to the New Member Sign Up page. You will need to provide the following information:  a. Enter your Elepath Access Code exactly as it appears at the top of this page. (You will not need to use this code after you’ve completed the sign-up process. If you do not sign up before the expiration date, you must request a new code.)   b. Enter your date of birth.   c. Enter your home email address.   d. Click Submit, and follow the next screen’s instructions.  3. Create a Elepath ID. This will be your Elepath login ID and cannot be changed, so think of one that is secure and easy to remember.  4. Create a Elepath password. You can change your password at any time.  5. Enter your Password Reset Question and Answer. This can be used at a later time if you forget your password.   6. Enter your e-mail address. This allows you to receive e-mail notifications when new information is available in  Tickade.  7. Click Sign Up. You can now view your health information.    For assistance activating your Tickade account, call [phone]

## 2017-02-27 NOTE — Clinical Note
[address],   [address] [place] [address]  Phone: [phone] - Fax: [phone]        [facility] Progress Report and Disability Certification  Date of Service: 2/27/2017   No Show:  No  Date / Time of Next Visit: 3/3/2017@4:00 PM   Claim Information   Patient Name: Fernando Gomes  Claim Number:     Employer: [employer]  Date of Injury: 2/20/2017     Insurer / TPA: Workers Choice  ID / SSN:     Occupation: Vital  Diagnosis: The encounter diagnosis was Sprain of left ankle, unspecified ligament, subsequent encounter.    Medical Information   Related to Industrial Injury?      Subjective Complaints:  DOI 2/20/2017. Mechanism of injury-slipped on stairs. 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 She reports she is improved.   Objective Findings: Appearance: Well-developed, well-nourished.   Mental Status: Pleasant. Cooperative. Appropriate.   Musculoskeletal: Moderate limp. Left ankle and foot have moderate ecchymosis. Distal neurovascular intact.   Pre-Existing Condition(s):     Assessment:   Condition Improved    Status: Additional Care Required  Permanent Disability:No    Plan:      Diagnostics:      Comments:       Disability Information   Status: Released to Restricted Duty    From:  2/27/2017  Through: 3/3/2017 Restrictions are: Temporary   Physical Restrictions   Sitting:    Standing:  < or = to 4 hrs/day Stooping:    Bending:      Squatting:    Walking:  < or = to 4 hrs/day Climbing:    Pushing:      Pulling:    Other:    Reaching Above Shoulder (L):   Reaching Above Shoulder (R):       Reaching Below Shoulder (L):    Reaching Below Shoulder (R):      Not to exceed Weight Limits   Carrying(hrs):   Weight Limit(lb):   Lifting(hrs):   Weight  Limit(lb):     Comments: Sit down mostly    Repetitive Actions   Hands: i.e. Fine Manipulations from Grasping:     Feet: i.e. Operating Foot Controls:      Driving / Operate Machinery:     Physician Name: Benigno Garcia M.D. Physician Signature: BENIGNO Parikh M.D. e-Signature: Dr. Francisco Diop, Medical Director   Clinic Name / Location: [address],   [address]  [place] [address] Clinic Phone Number: Dept: [phone]   Appointment Time: 3:40 Pm Visit Start Time: 3:52 PM   Check-In Time:  3:42 Pm Visit Discharge Time:  4:10 PM   Original-Treating Physician or Chiropractor    Page 2-Insurer/TPA    Page 3-Employer    Page 4-Employee

## 2017-02-28 NOTE — PROGRESS NOTES
"Subjective:      Fernando Gomes is a 41 y.o. female who presents with Follow-Up      DOI 2/20/2017. Mechanism of injury-slipped on stairs. 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 She reports she is improved.     HPI    ROS       Objective:     /98 mmHg  Pulse 77  Temp(Src) 36.3 °C (97.3 °F)  Resp 16  Ht 1.6 m (5' 3\")  Wt 75.751 kg (167 lb)  BMI 29.59 kg/m2  SpO2 96%  LMP 02/16/2017 (Exact Date)     Physical Exam    Appearance: Well-developed, well-nourished.   Mental Status: Pleasant. Cooperative. Appropriate.   Musculoskeletal: Moderate limp. Left ankle and foot have moderate ecchymosis. Distal neurovascular intact.       Assessment/Plan:     1. Sprain of left ankle, unspecified ligament, subsequent encounter  Slightly improved  Advance restrictions  Recheck in 4 days        "

## 2017-03-03 ENCOUNTER — OCCUPATIONAL MEDICINE (OUTPATIENT)
Dept: OCCUPATIONAL MEDICINE | Facility: CLINIC | Age: 42
End: 2017-03-03
Payer: COMMERCIAL

## 2017-03-03 VITALS
BODY MASS INDEX: 29.59 KG/M2 | WEIGHT: 167 LBS | HEIGHT: 63 IN | OXYGEN SATURATION: 95 % | SYSTOLIC BLOOD PRESSURE: 124 MMHG | TEMPERATURE: 97.6 F | DIASTOLIC BLOOD PRESSURE: 80 MMHG | RESPIRATION RATE: 14 BRPM | HEART RATE: 73 BPM

## 2017-03-03 DIAGNOSIS — S93.402D SPRAIN OF LEFT ANKLE, UNSPECIFIED LIGAMENT, SUBSEQUENT ENCOUNTER: ICD-10-CM

## 2017-03-03 PROCEDURE — 99213 OFFICE O/P EST LOW 20 MIN: CPT | Performed by: PREVENTIVE MEDICINE

## 2017-03-03 ASSESSMENT — PAIN SCALES - GENERAL: PAINLEVEL: 5=MODERATE PAIN

## 2017-03-03 NOTE — Clinical Note
[address],   [address] [place] [address]  Phone: [phone] - Fax: [phone]        [facility] Progress Report and Disability Certification  Date of Service: 3/3/2017   No Show:  No  Date / Time of Next Visit:   MMI    Claim Information   Patient Name: Fernando Gomes  Claim Number:     Employer: [employer]  Date of Injury: 2/20/2017     Insurer / TPA: [employer]  ID / SSN:     Occupation: Vital  Diagnosis: The encounter diagnosis was Sprain of left ankle, unspecified ligament, subsequent encounter.    Medical Information   Related to Industrial Injury?      Subjective Complaints:  DOI 2/20/2017. Mechanism of injury-slipped on stairs. 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 She reports she is improved.   Objective Findings: Left ankle shows reduced swelling. Ecchymosis is improved. Gait is normal. Normal toe and heel walking.   Pre-Existing Condition(s):     Assessment:   Condition Improved    Status: Discharged /  MMI  Permanent Disability:No    Plan:      Diagnostics:      Comments:       Disability Information   Status: Released to Full Duty    From:  3/3/2017  Through:   Restrictions are:     Physical Restrictions   Sitting:    Standing:    Stooping:    Bending:      Squatting:    Walking:    Climbing:    Pushing:      Pulling:    Other:    Reaching Above Shoulder (L):   Reaching Above Shoulder (R):       Reaching Below Shoulder (L):    Reaching Below Shoulder (R):      Not to exceed Weight Limits   Carrying(hrs):   Weight Limit(lb):   Lifting(hrs):   Weight  Limit(lb):     Comments:      Repetitive Actions   Hands: i.e. Fine Manipulations from Grasping:     Feet: i.e. Operating Foot Controls:     Driving / Operate Machinery:     Physician Name: Benigno Garcia M.D. Physician Signature: BENIGNO Parikh M.D. e-Signature: Dr. Francisco Diop, Medical Director   Clinic Name  / Location: [address],   [address]  [place] [address] Clinic Phone Number: Dept: [phone]   Appointment Time: 4:00 Pm Visit Start Time: 4:01 PM   Check-In Time:  3:35 Pm Visit Discharge Time:  @4:36PM   Original-Treating Physician or Chiropractor    Page 2-Insurer/TPA    Page 3-Employer    Page 4-Employee

## 2017-03-03 NOTE — MR AVS SNAPSHOT
"        Fernando Overtonn   3/3/2017 4:00 PM   Occupational Medicine   MRN: [id number]    Department:  [facility]   Dept Phone:  [phone]    Description:  Female : 1975   Provider:  Benigno Garcia M.D.           Reason for Visit     Follow-Up WC DOI 2017 - L Ankle - Better  - ROOM 2      Allergies as of 3/3/2017     No Known Allergies      You were diagnosed with     Sprain of left ankle, unspecified ligament, subsequent encounter   [[id number]]         Vital Signs     Blood Pressure Pulse Temperature Respirations Height Weight    124/80 mmHg 73 36.4 °C (97.6 °F) 14 1.6 m (5' 2.99\") 75.751 kg (167 lb)    Body Mass Index Oxygen Saturation Last Menstrual Period Smoking Status          29.59 kg/m2 95% 2017 (Exact Date) Never Smoker         Basic Information     Date Of Birth Sex Race Ethnicity Preferred Language Language for Written Material    1975 Female White  Origin (Em[place],Tongan,American,[place]an, etc) English [place]      Health Maintenance        Date Due Completion Dates    IMM DTaP/Tdap/Td Vaccine (1 - Tdap) 1995 ---    PAP SMEAR 1997 ---    MAMMOGRAM 2016 ---    IMM INFLUENZA (1) 2016 ---            Current Immunizations     No immunizations on file.      Below and/or attached are the medications your provider expects you to take. Review all of your home medications and newly ordered medications with your provider and/or pharmacist. Follow medication instructions as directed by your provider and/or pharmacist. Please keep your medication list with you and share with your provider. Update the information when medications are discontinued, doses are changed, or new medications (including over-the-counter products) are added; and carry medication information at all times in the event of emergency situations     Allergies:  No Known Allergies          Medications  Valid as of: 2017 -  4:40 PM    Generic Name Brand Name Tablet Size " Instructions for use    Ibuprofen (Tab) MOTRIN 600 MG Take 1 Tab by mouth every 6 hours as needed for Mild Pain.        Ibuprofen (Tab) MOTRIN 600 MG Take 1 Tab by mouth every 6 hours as needed.        .                 Medicines prescribed today were sent to:     [id number] - [place], [place] - [address]    [address]  [place] [address]    Phone: [phone] Fax: [phone]    Open 24 Hours?: No      Medication refill instructions:       If your prescription bottle indicates you have medication refills left, it is not necessary to call your provider’s office. Please contact your pharmacy and they will refill your medication.    If your prescription bottle indicates you do not have any refills left, you may request refills at any time through one of the following ways: The online [employer] system (except Urgent Care), by calling your provider’s office, or by asking your pharmacy to contact your provider’s office with a refill request. Medication refills are processed only during regular business hours and may not be available until the next business day. Your provider may request additional information or to have a follow-up visit with you prior to refilling your medication.   *Please Note: Medication refills are assigned a new Rx number when refilled electronically. Your pharmacy may indicate that no refills were authorized even though a new prescription for the same medication is available at the pharmacy. Please request the medicine by name with the pharmacy before contacting your provider for a refill.           [employer] Access Code: [email or web address]  Expires: 3/22/2017  6:03 PM    [employer]  A secure, online tool to manage your health information     [employer]® is a secure, online tool that connects you to your personalized health information from the privacy of your home -- day or night - making it very easy for you to manage your healthcare. Once the activation process is completed, you  can even access your medical information using the NaviExpert biju, which is available for free in the Apple Biju store or Google Play store.     Nav[employer]Expert provides the following levels of access (as shown below):   My Chart Features   [facility] Primary [facility] Doctor [facility]  Specialists [facility]  Urgent  Care Non-[facility]  Primary Care  Doctor   Email your healthcare team securely and privately 24/7 X X X    Manage appointments: schedule your next appointment; view details of past/upcoming appointments X      Request prescription refills. X      View recent personal medical records, including lab and immunizations X X X X   View health record, including health history, allergies, medications X X X X   Read reports about your outpatient visits, procedures, consult and ER notes X X X X   See your discharge summary, which is a recap of your hospital and/or ER visit that includes your diagnosis, lab results, and care plan. X X       How to register for [employer]pert:  1. Go to  [email or web address].  2. Click on the Sign Up Now box, which takes you to the New Member Sign Up page. You will need to provide the following information:  a. Enter your Nav[employer]Expert Access Code exactly as it appears at the top of this page. (You will not need to use this code after you’ve completed the sign-up process. If you do not sign up before the expiration date, you must request a new code.)   b. Enter your date of birth.   c. Enter your home email address.   d. Click Submit, and follow the next screen’s instructions.  3. Create a NaviExpert ID. This will be your [employer]pert login ID and cannot be changed, so think of one that is secure and easy to remember.  4. Create a [employer]pert password. You can change your password at any time.  5. Enter your Password Reset Question and Answer. This can be used at a later time if you forget your password.   6. Enter your e-mail address. This allows you to receive e-mail notifications when new information is available in  [employer].  7. Click Sign Up. You can now view your health information.    For assistance activating your [employer] account, call [phone]

## 2017-03-04 NOTE — PROGRESS NOTES
"Subjective:      Fernando Gomes is a 41 y.o. female who presents with Follow-Up      DOI 2/20/2017. Mechanism of injury-slipped on stairs. 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 She reports she is improved.     HPI    ROS       Objective:     /80 mmHg  Pulse 73  Temp(Src) 36.4 °C (97.6 °F)  Resp 14  Ht 1.6 m (5' 2.99\")  Wt 75.751 kg (167 lb)  BMI 29.59 kg/m2  SpO2 95%  LMP 02/16/2017 (Exact Date)     Physical Exam    Left ankle shows reduced swelling. Ecchymosis is improved. Gait is normal. Normal toe and heel walking.       Assessment/Plan:     1. Sprain of left ankle, unspecified ligament, subsequent encounter  Condition improved  Regular work  No impairment  Released from care        "

## 2019-05-16 ENCOUNTER — HOSPITAL ENCOUNTER (OUTPATIENT)
Dept: LAB | Facility: MEDICAL CENTER | Age: 44
End: 2019-05-16
Attending: FAMILY MEDICINE

## 2019-05-16 LAB
ALBUMIN SERPL BCP-MCNC: 4.2 G/DL (ref 3.2–4.9)
ALBUMIN/GLOB SERPL: 1.3 G/DL
ALP SERPL-CCNC: 61 U/L (ref 30–99)
ALT SERPL-CCNC: 19 U/L (ref 2–50)
ANION GAP SERPL CALC-SCNC: 11 MMOL/L (ref 0–11.9)
APPEARANCE UR: ABNORMAL
AST SERPL-CCNC: 14 U/L (ref 12–45)
BACTERIA #/AREA URNS HPF: ABNORMAL /HPF
BASOPHILS # BLD AUTO: 0.4 % (ref 0–1.8)
BASOPHILS # BLD: 0.04 K/UL (ref 0–0.12)
BILIRUB SERPL-MCNC: 0.4 MG/DL (ref 0.1–1.5)
BILIRUB UR QL STRIP.AUTO: NEGATIVE
BUN SERPL-MCNC: 9 MG/DL (ref 8–22)
CALCIUM SERPL-MCNC: 8.9 MG/DL (ref 8.5–10.5)
CHLORIDE SERPL-SCNC: 107 MMOL/L (ref 96–112)
CHOLEST SERPL-MCNC: 197 MG/DL (ref 100–199)
CO2 SERPL-SCNC: 20 MMOL/L (ref 20–33)
COLOR UR: YELLOW
CREAT SERPL-MCNC: 0.82 MG/DL (ref 0.5–1.4)
EOSINOPHIL # BLD AUTO: 0.03 K/UL (ref 0–0.51)
EOSINOPHIL NFR BLD: 0.3 % (ref 0–6.9)
EPI CELLS #/AREA URNS HPF: ABNORMAL /HPF
ERYTHROCYTE [DISTWIDTH] IN BLOOD BY AUTOMATED COUNT: 56.4 FL (ref 35.9–50)
EST. AVERAGE GLUCOSE BLD GHB EST-MCNC: 131 MG/DL
FASTING STATUS PATIENT QL REPORTED: NORMAL
GLOBULIN SER CALC-MCNC: 3.3 G/DL (ref 1.9–3.5)
GLUCOSE SERPL-MCNC: 88 MG/DL (ref 65–99)
GLUCOSE UR STRIP.AUTO-MCNC: NEGATIVE MG/DL
HBA1C MFR BLD: 6.2 % (ref 0–5.6)
HCT VFR BLD AUTO: 43.1 % (ref 37–47)
HDLC SERPL-MCNC: 50 MG/DL
HGB BLD-MCNC: 13.4 G/DL (ref 12–16)
HYALINE CASTS #/AREA URNS LPF: ABNORMAL /LPF
IMM GRANULOCYTES # BLD AUTO: 0.03 K/UL (ref 0–0.11)
IMM GRANULOCYTES NFR BLD AUTO: 0.3 % (ref 0–0.9)
IRON SERPL-MCNC: 53 UG/DL (ref 40–170)
KETONES UR STRIP.AUTO-MCNC: ABNORMAL MG/DL
LDLC SERPL CALC-MCNC: 119 MG/DL
LEUKOCYTE ESTERASE UR QL STRIP.AUTO: ABNORMAL
LYMPHOCYTES # BLD AUTO: 3.21 K/UL (ref 1–4.8)
LYMPHOCYTES NFR BLD: 33.9 % (ref 22–41)
MCH RBC QN AUTO: 25 PG (ref 27–33)
MCHC RBC AUTO-ENTMCNC: 31.1 G/DL (ref 33.6–35)
MCV RBC AUTO: 80.6 FL (ref 81.4–97.8)
MICRO URNS: ABNORMAL
MONOCYTES # BLD AUTO: 0.38 K/UL (ref 0–0.85)
MONOCYTES NFR BLD AUTO: 4 % (ref 0–13.4)
NEUTROPHILS # BLD AUTO: 5.79 K/UL (ref 2–7.15)
NEUTROPHILS NFR BLD: 61.1 % (ref 44–72)
NITRITE UR QL STRIP.AUTO: NEGATIVE
NRBC # BLD AUTO: 0 K/UL
NRBC BLD-RTO: 0 /100 WBC
PH UR STRIP.AUTO: 6 [PH]
PLATELET # BLD AUTO: 391 K/UL (ref 164–446)
PMV BLD AUTO: 10.6 FL (ref 9–12.9)
POTASSIUM SERPL-SCNC: 3.6 MMOL/L (ref 3.6–5.5)
PROT SERPL-MCNC: 7.5 G/DL (ref 6–8.2)
PROT UR QL STRIP: NEGATIVE MG/DL
RBC # BLD AUTO: 5.35 M/UL (ref 4.2–5.4)
RBC # URNS HPF: ABNORMAL /HPF
RBC UR QL AUTO: ABNORMAL
SODIUM SERPL-SCNC: 138 MMOL/L (ref 135–145)
SP GR UR STRIP.AUTO: 1.02
TRIGL SERPL-MCNC: 141 MG/DL (ref 0–149)
TSH SERPL DL<=0.005 MIU/L-ACNC: 1.26 UIU/ML (ref 0.38–5.33)
UROBILINOGEN UR STRIP.AUTO-MCNC: 0.2 MG/DL
WBC # BLD AUTO: 9.5 K/UL (ref 4.8–10.8)
WBC #/AREA URNS HPF: ABNORMAL /HPF

## 2019-05-16 PROCEDURE — 80061 LIPID PANEL: CPT

## 2019-05-16 PROCEDURE — 36415 COLL VENOUS BLD VENIPUNCTURE: CPT

## 2019-05-16 PROCEDURE — 83540 ASSAY OF IRON: CPT

## 2019-05-16 PROCEDURE — 85025 COMPLETE CBC W/AUTO DIFF WBC: CPT

## 2019-05-16 PROCEDURE — 84443 ASSAY THYROID STIM HORMONE: CPT

## 2019-05-16 PROCEDURE — 80053 COMPREHEN METABOLIC PANEL: CPT

## 2019-05-16 PROCEDURE — 81001 URINALYSIS AUTO W/SCOPE: CPT

## 2019-05-16 PROCEDURE — 83036 HEMOGLOBIN GLYCOSYLATED A1C: CPT

## 2023-12-13 ENCOUNTER — APPOINTMENT (OUTPATIENT)
Dept: RADIOLOGY | Facility: MEDICAL CENTER | Age: 48
End: 2023-12-13
Attending: EMERGENCY MEDICINE
Payer: COMMERCIAL

## 2023-12-13 ENCOUNTER — HOSPITAL ENCOUNTER (EMERGENCY)
Facility: MEDICAL CENTER | Age: 48
End: 2023-12-13
Attending: EMERGENCY MEDICINE
Payer: COMMERCIAL

## 2023-12-13 VITALS
HEART RATE: 73 BPM | OXYGEN SATURATION: 98 % | WEIGHT: 197.53 LBS | RESPIRATION RATE: 16 BRPM | BODY MASS INDEX: 33.72 KG/M2 | HEIGHT: 64 IN | TEMPERATURE: 98.4 F | DIASTOLIC BLOOD PRESSURE: 116 MMHG | SYSTOLIC BLOOD PRESSURE: 242 MMHG

## 2023-12-13 DIAGNOSIS — I10 PRIMARY HYPERTENSION: ICD-10-CM

## 2023-12-13 DIAGNOSIS — M25.562 ACUTE PAIN OF LEFT KNEE: ICD-10-CM

## 2023-12-13 DIAGNOSIS — M17.12 TRICOMPARTMENT OSTEOARTHRITIS OF LEFT KNEE: ICD-10-CM

## 2023-12-13 PROCEDURE — 99283 EMERGENCY DEPT VISIT LOW MDM: CPT

## 2023-12-13 PROCEDURE — 93971 EXTREMITY STUDY: CPT | Mod: LT

## 2023-12-13 PROCEDURE — A9270 NON-COVERED ITEM OR SERVICE: HCPCS | Performed by: EMERGENCY MEDICINE

## 2023-12-13 PROCEDURE — 73564 X-RAY EXAM KNEE 4 OR MORE: CPT | Mod: LT

## 2023-12-13 PROCEDURE — 700102 HCHG RX REV CODE 250 W/ 637 OVERRIDE(OP): Performed by: EMERGENCY MEDICINE

## 2023-12-13 RX ORDER — HYDROCODONE BITARTRATE AND ACETAMINOPHEN 5; 325 MG/1; MG/1
1 TABLET ORAL ONCE
Status: COMPLETED | OUTPATIENT
Start: 2023-12-13 | End: 2023-12-13

## 2023-12-13 RX ORDER — AMLODIPINE BESYLATE 5 MG/1
5 TABLET ORAL DAILY
Qty: 30 TABLET | Refills: 0 | Status: SHIPPED | OUTPATIENT
Start: 2023-12-13 | End: 2023-12-21

## 2023-12-13 RX ADMIN — HYDROCODONE BITARTRATE AND ACETAMINOPHEN 1 TABLET: 5; 325 TABLET ORAL at 15:06

## 2023-12-13 NOTE — ED TRIAGE NOTES
"Chief Complaint   Patient presents with    Knee Pain     Patient states that pain began in the middle of the night a few nights ago. Reports difficulty walking, but is ambulatory       Patient to triage ambulatory with a steady gait, AAOx4, Appropriate precautions in place.     Explained wait time and triage process. Placed back in lobby. Told to notify ED tech or RN of any changes, verbalized understanding.    BP (!) 189/110   Pulse 78   Temp 36 °C (96.8 °F) (Temporal)   Resp 16   Ht 1.62 m (5' 3.78\")   Wt 89.6 kg (197 lb 8.5 oz)   SpO2 92%   BMI 34.14 kg/m²     "

## 2023-12-13 NOTE — ED PROVIDER NOTES
"ED Provider Note    CHIEF COMPLAINT  Chief Complaint   Patient presents with    Knee Pain     Patient states that pain began in the middle of the night a few nights ago. Reports difficulty walking, but is ambulatory       EXTERNAL RECORDS REVIEWED  Patient has had 3 prior encounters in the occupational medicine clinic.  Most recently she was seen in March 2017 for a left ankle sprain this was preceded by an ED visit in 2017 for the same complaint.    Recent encounter in the [facility] for follow-up noted to have a blood pressure of 162/106 at the time.    HPI/ROS  LIMITATION TO HISTORY   Select: : None  OUTSIDE HISTORIAN(S):  None    Fernando Gomes is a 48 y.o. female who presents to the emergency department with a chief complaint of left knee pain.  No known trauma or falls.  She has no history of DVT or PE.  No chest pain or shortness of breath.  She has pain to her left upper calf and behind her left knee as well.  No significant swelling or traumatic changes to the knee.  Mild pain with range of motion.    PAST MEDICAL HISTORY   None reported.    SURGICAL HISTORY  patient denies any surgical history    FAMILY HISTORY  History reviewed. No pertinent family history.    SOCIAL HISTORY  Social History     Tobacco Use    Smoking status: Every Day     Current packs/day: 0.10     Types: Cigarettes    Smokeless tobacco: Not on file   Vaping Use    Vaping Use: Never used   Substance and Sexual Activity    Alcohol use: No    Drug use: No    Sexual activity: Not on file       CURRENT MEDICATIONS  Home Medications       Reviewed by Althea Whelan R.N. (Registered Nurse) on 12/13/23 at 1357  Med List Status: Partial     Medication Last Dose Status   ibuprofen (MOTRIN) 600 MG Tab  Active   ibuprofen (MOTRIN) 600 MG Tab  Active                    ALLERGIES  No Known Allergies    PHYSICAL EXAM  VITAL SIGNS: BP (!) 242/116   Pulse 73   Temp 36.9 °C (98.4 °F) (Temporal)   Resp 16   Ht 1.62 m (5' 3.78\")   " Wt 89.6 kg (197 lb 8.5 oz)   SpO2 98%   BMI 34.14 kg/m²    Vitals reviewed.  Constitutional: Patient is oriented to person, place, and time. Appears well-developed and well-nourished. No distress.    Head: Normocephalic and atraumatic.   Mouth/Throat: Oropharynx is clear  Eyes: Conjunctivae are normal.   Neck: Normal range of motion.   Cardiovascular: Normal rate, regular rhythm and normal heart sounds. Normal peripheral pulses, bilateral lower extremity.  Pulmonary/Chest: Effort normal,  No respiratory distress  Abdominal: Soft. There is no tenderness.  Musculoskeletal: No edema.  Limited range of motion of left knee secondary to pain.  No color changes.  No temperature changes when compared to the contralateral side.  There is tenderness to the left calf and the posterior aspect of the left knee.  No significant swelling.  No deformities.   Neurological: Antalgic gait. no focal deficits.   Skin: Skin is warm and dry. No erythema. No pallor.   Psychiatric: Patient has a normal mood and affect.       DIAGNOSTIC STUDIES / PROCEDURES    RADIOLOGY  I have independently interpreted the diagnostic imaging associated with this visit and am waiting the final reading from the radiologist.   My preliminary interpretation is as follows: DJD - knee, no fracture  Radiologist interpretation:   US-EXTREMITY VENOUS LOWER UNILAT LEFT   Final Result      DX-KNEE COMPLETE 4+ LEFT   Final Result      1.  No radiographic evidence of acute traumatic injury.   2.  Tricompartmental degenerative changes, severe in the patellofemoral compartment.          COURSE & MEDICAL DECISION MAKING    ED Observation Status? No; Patient does not meet criteria for ED Observation.     INITIAL ASSESSMENT, COURSE AND PLAN  Care Narrative:     This is a pleasant 48-year-old female.  She presents here with left knee pain as well as left upper calf and pain behind the left knee.  No history of trauma or falls.  No history of thromboembolic disease.  Concern  for DVT I have ordered an ultrasound as well as an x-ray.    Patient is reevaluated at the bedside.  She does report hypertension, this is something she has had in the past.  She is asymptomatic.  Denies chest pain, shortness of breath, neurologic deficits, headaches.  Unfortunately, she does not have a primary care doctor.  I have advised that she begin taking medication for this.  I did improve and came down after pain medication to 197/96.  She started on Norvasc.  We discussed x-ray findings which showed tricompartment, osteoarthritis of the knee.  Unfortunately, other than physical therapy, evaluation by orthopedics, no definitive treatment for this other than pain management in the ED.  I do encourage her to follow-up with PCP.  Patient is discharged home in stable condition.    DISPOSITION AND DISCUSSIONS  I have discussed management of the patient with the following physicians and MARTIN's:  None    Discussion of management with other QHP or appropriate source(s): None     Escalation of care considered, and ultimately not performed:blood analysis    Barriers to care at this time, including but not limited to: Patient does not have established PCP.     Decision tools and prescription drugs considered including, but not limited to:  None .    FINAL DIAGNOSIS  1. Acute pain of left knee    2. Tricompartment osteoarthritis of left knee    3. Primary hypertension           Electronically signed by: Maria Elena Brito D.O., 12/13/2023 2:43 PM

## 2023-12-21 ENCOUNTER — OFFICE VISIT (OUTPATIENT)
Dept: INTERNAL MEDICINE | Facility: OTHER | Age: 48
End: 2023-12-21
Payer: COMMERCIAL

## 2023-12-21 VITALS
BODY MASS INDEX: 33.9 KG/M2 | HEART RATE: 77 BPM | DIASTOLIC BLOOD PRESSURE: 105 MMHG | WEIGHT: 198.6 LBS | OXYGEN SATURATION: 97 % | HEIGHT: 64 IN | TEMPERATURE: 97.4 F | SYSTOLIC BLOOD PRESSURE: 165 MMHG

## 2023-12-21 DIAGNOSIS — I10 PRIMARY HYPERTENSION: ICD-10-CM

## 2023-12-21 DIAGNOSIS — E66.9 CLASS 1 OBESITY IN ADULT, UNSPECIFIED BMI, UNSPECIFIED OBESITY TYPE, UNSPECIFIED WHETHER SERIOUS COMORBIDITY PRESENT: ICD-10-CM

## 2023-12-21 DIAGNOSIS — M76.9 SEMIMEMBRANOSUS TENDINITIS: ICD-10-CM

## 2023-12-21 PROCEDURE — 99204 OFFICE O/P NEW MOD 45 MIN: CPT | Mod: GC

## 2023-12-21 PROCEDURE — 3077F SYST BP >= 140 MM HG: CPT

## 2023-12-21 PROCEDURE — 3080F DIAST BP >= 90 MM HG: CPT

## 2023-12-21 PROCEDURE — 1125F AMNT PAIN NOTED PAIN PRSNT: CPT

## 2023-12-21 RX ORDER — LISINOPRIL 10 MG/1
10 TABLET ORAL DAILY
Qty: 30 TABLET | Refills: 0 | Status: CANCELLED | OUTPATIENT
Start: 2023-12-21

## 2023-12-21 RX ORDER — AMLODIPINE BESYLATE 5 MG/1
5 TABLET ORAL DAILY
Qty: 30 TABLET | Refills: 0 | Status: SHIPPED | OUTPATIENT
Start: 2023-12-21 | End: 2024-01-31

## 2023-12-21 RX ORDER — METHOCARBAMOL 750 MG/1
750 TABLET, FILM COATED ORAL 4 TIMES DAILY
Qty: 60 TABLET | Refills: 0 | Status: SHIPPED | OUTPATIENT
Start: 2023-12-21 | End: 2023-12-21

## 2023-12-21 RX ORDER — NAPROXEN 500 MG/1
500 TABLET ORAL 2 TIMES DAILY WITH MEALS
Qty: 30 TABLET | Refills: 0 | Status: SHIPPED | OUTPATIENT
Start: 2023-12-21 | End: 2024-01-08

## 2023-12-21 RX ORDER — IBUPROFEN 600 MG/1
600 TABLET ORAL EVERY 6 HOURS PRN
Qty: 30 TABLET | Refills: 0 | Status: CANCELLED | OUTPATIENT
Start: 2023-12-21

## 2023-12-21 ASSESSMENT — PAIN SCALES - GENERAL: PAINLEVEL: 10=SEVERE PAIN

## 2023-12-21 ASSESSMENT — PATIENT HEALTH QUESTIONNAIRE - PHQ9: CLINICAL INTERPRETATION OF PHQ2 SCORE: 0

## 2023-12-21 NOTE — PROGRESS NOTES
HISTORY OF PRESENT ILLNESS: Patient is a 48 y.o. female established patient who presents today for the following.      Patient is a 48 year old female who presents for hospital follow up regarding pain behind her left knee. Patient states it has been going for 10 days, happened all of a sudden when she woke up from sleep, she describes is sharp, constant, worse with movement.  Patient states she has tried ibuprofen with mild relief for her pain.  Patient does states she works at [employer] and she is on her feet all day.  Patient states her pain is 10 out of 10 at times.  Patient denies any fevers, chills, chest pain, abdominal pain, nausea, vomiting.  Patient also states she has pain in her knuckles bilaterally but no swelling.      No past medical history on file.    There are no problems to display for this patient.      Allergies: Patient has no known allergies.    Current Outpatient Medications   Medication Sig Dispense Refill    ibuprofen (MOTRIN) 600 MG Tab Take 1 Tab by mouth every 6 hours as needed for Mild Pain. 30 Tab 0    amLODIPine (NORVASC) 5 MG Tab Take 1 Tablet by mouth every day. (Patient not taking: Reported on 12/21/2023) 30 Tablet 0    ibuprofen (MOTRIN) 600 MG Tab Take 1 Tab by mouth every 6 hours as needed. (Patient not taking: Reported on 12/21/2023) 30 Tab 0     No current facility-administered medications for this visit.       Social History     Tobacco Use    Smoking status: Every Day     Current packs/day: 0.10     Types: Cigarettes   Vaping Use    Vaping Use: Never used   Substance Use Topics    Alcohol use: No    Drug use: No       No family history on file.      Review of Systems   Review of Systems   Constitutional:  Negative for chills and fever.   Respiratory:  Negative for cough, hemoptysis and shortness of breath.    Cardiovascular:  Negative for chest pain and palpitations.   Gastrointestinal:  Negative for abdominal pain, diarrhea and vomiting.   Musculoskeletal:  Positive  "for myalgias.   Neurological:  Negative for dizziness and headaches.       Exam:  BP (!) 165/105 (BP Location: Right arm, Patient Position: Sitting, BP Cuff Size: Large adult)   Pulse 77   Temp 36.3 °C (97.4 °F) (Temporal)   Ht 1.613 m (5' 3.5\")   Wt 90.1 kg (198 lb 9.6 oz)   SpO2 97%  Body mass index is 34.63 kg/m².    Constitutional:  Not in acute distress, well appearing.  HEENT:   NC/AT  Cardiovascular: Regular rate and rhythm. No murmurs or gallops.      Lungs:   Clear to auscultation bilaterally. No wheezes or crackles. No respiratory distress.  Abdomen: Not distended, soft, not tender. No guarding or rigidity. No masses.  Extremities:  No cyanosis/clubbing/edema. No obvious deformities.  No swelling, erythema noted on DIP or PIP  Left knee: No swelling, no joint line tenderness, no warmth.  Tenderness present at semimembranosus tendon.  Skin:  Warm and dry.  No visible rashes.  Neurologic: Alert & oriented x 3, CN II-XII grossly intact, strength and sensation grossly intact.  No focal deficits noted.  Psychiatric:  Affect normal, mood normal, judgment normal.    Assessment/Plan:     Assessment/Plan:     1. Class 1 obesity in adult, unspecified BMI, unspecified obesity type, unspecified whether serious comorbidity present  We will screen patient for metabolic disease  - HEMOGLOBIN A1C; Future  - Comp Metabolic Panel; Future  - Lipid Profile; Future    2. Primary hypertension  Consistently elevated at previous office visits, elevated at today's visit with a blood pressure 165/105.  Denies any symptoms of endorgan damage.  Will start patient on low-dose amlodipine and titrate as appropriate  - amLODIPine (NORVASC) 5 MG Tab; Take 1 Tablet by mouth every day.  Dispense: 30 Tablet; Refill: 0    3. Semimembranosus tendinitis  Patient has exquisite tenderness present on her semimembranosus tendon, no joint swelling warmth or joint line tenderness appreciated on examination.  Patient denies any fevers or chills.  " Patient denies any previous orthopedic interventions.  Will try conservative therapy with stretches, NSAIDs and physical therapy.  Given handout on tendinitis.  - naproxen (NAPROSYN) 500 MG Tab; Take 1 Tablet by mouth 2 times a day with meals.  Dispense: 30 Tablet; Refill: 0  - Referral to Physical Therapy  -Advised patient to go to orthopedic urgent care if not improving for possible corticosteroid injection.    All imaging results and lab results and consult notes are reviewed at this visit.  Followup: Return in about 5 weeks (around 1/25/2024).    Please note that this dictation was created using voice recognition software. I have made every reasonable attempt to correct obvious errors, but I expect that there are errors of grammar and possibly content that I did not discover before finalizing the note.    Ministerio Ball, DO  PGY-2  Internal Medicine

## 2023-12-21 NOTE — PATIENT INSTRUCTIONS
[facility]  [address] [place], [place], [address]    Thank you for visiting me today at the [facility].   Please follow up with me in 3 months.

## 2023-12-21 NOTE — LETTER
[facility]  Ministerio Ball D.O.  [address]  [place] [address]  Fax: [phone]   Authorization for Release/Disclosure of   Protected Health Information   Name: ESTELA PATTERSON : 1975 SSN: [id number]   Address: [address] [place] [address] [facility]  [place] [address] Phone:    [phone] (home)    I authorize the entity listed below to release/disclose the PHI below to:   [facility]/Ministerio Ball D.O. and Ministerio Ball D.O.   Provider or Entity Name:     Address   City, State, Zip   Phone:      Fax:     Reason for request: continuity of care   Information to be released:    [  ] LAST COLONOSCOPY,  including any PATH REPORT and follow-up  [  ] LAST FIT/COLOGUARD RESULT [  ] LAST DEXA  [  ] LAST MAMMOGRAM  [  ] LAST PAP  [  ] LAST LABS [  ] RETINA EXAM REPORT  [  ] IMMUNIZATION RECORDS  [  ] Release all info      [  ] Check here and initial the line next to each item to release ALL health information INCLUDING  _____ Care and treatment for drug and / or alcohol abuse  _____ HIV testing, infection status, or AIDS  _____ Genetic Testing    DATES OF SERVICE OR TIME PERIOD TO BE DISCLOSED: _____________  I understand and acknowledge that:  * This Authorization may be revoked at any time by you in writing, except if your health information has already been used or disclosed.  * Your health information that will be used or disclosed as a result of you signing this authorization could be re-disclosed by the recipient. If this occurs, your re-disclosed health information may no longer be protected by State or Federal laws.  * You may refuse to sign this Authorization. Your refusal will not affect your ability to obtain treatment.  * This Authorization becomes effective upon signing and will  on (date) __________.      If no date is indicated, this Authorization will  one (1) year from the signature date.    Name: Estela Patterson  Signature: Date:   2023     PLEASE FAX REQUESTED  RECORDS BACK TO: [phone]

## 2024-01-06 DIAGNOSIS — M76.9 SEMIMEMBRANOSUS TENDINITIS: ICD-10-CM

## 2024-01-08 RX ORDER — NAPROXEN 500 MG/1
500 TABLET ORAL 2 TIMES DAILY WITH MEALS
Qty: 30 TABLET | Refills: 0 | Status: SHIPPED | OUTPATIENT
Start: 2024-01-08 | End: 2024-02-01

## 2024-01-26 ENCOUNTER — HOSPITAL ENCOUNTER (OUTPATIENT)
Dept: LAB | Facility: MEDICAL CENTER | Age: 49
End: 2024-01-26
Payer: COMMERCIAL

## 2024-01-26 DIAGNOSIS — E66.9 CLASS 1 OBESITY IN ADULT, UNSPECIFIED BMI, UNSPECIFIED OBESITY TYPE, UNSPECIFIED WHETHER SERIOUS COMORBIDITY PRESENT: ICD-10-CM

## 2024-01-26 LAB
ALBUMIN SERPL BCP-MCNC: 4 G/DL (ref 3.2–4.9)
ALBUMIN/GLOB SERPL: 1.3 G/DL
ALP SERPL-CCNC: 90 U/L (ref 30–99)
ALT SERPL-CCNC: 20 U/L (ref 2–50)
ANION GAP SERPL CALC-SCNC: 12 MMOL/L (ref 7–16)
AST SERPL-CCNC: 22 U/L (ref 12–45)
BILIRUB SERPL-MCNC: 0.2 MG/DL (ref 0.1–1.5)
BUN SERPL-MCNC: 18 MG/DL (ref 8–22)
CALCIUM ALBUM COR SERPL-MCNC: 8.7 MG/DL (ref 8.5–10.5)
CALCIUM SERPL-MCNC: 8.7 MG/DL (ref 8.5–10.5)
CHLORIDE SERPL-SCNC: 104 MMOL/L (ref 96–112)
CHOLEST SERPL-MCNC: 204 MG/DL (ref 100–199)
CO2 SERPL-SCNC: 23 MMOL/L (ref 20–33)
CREAT SERPL-MCNC: 0.56 MG/DL (ref 0.5–1.4)
EST. AVERAGE GLUCOSE BLD GHB EST-MCNC: 140 MG/DL
GFR SERPLBLD CREATININE-BSD FMLA CKD-EPI: 113 ML/MIN/1.73 M 2
GLOBULIN SER CALC-MCNC: 3.2 G/DL (ref 1.9–3.5)
GLUCOSE SERPL-MCNC: 120 MG/DL (ref 65–99)
HBA1C MFR BLD: 6.5 % (ref 4–5.6)
HDLC SERPL-MCNC: 51 MG/DL
LDLC SERPL CALC-MCNC: 122 MG/DL
POTASSIUM SERPL-SCNC: 4.4 MMOL/L (ref 3.6–5.5)
PROT SERPL-MCNC: 7.2 G/DL (ref 6–8.2)
SODIUM SERPL-SCNC: 139 MMOL/L (ref 135–145)
TRIGL SERPL-MCNC: 157 MG/DL (ref 0–149)

## 2024-01-26 PROCEDURE — 80053 COMPREHEN METABOLIC PANEL: CPT

## 2024-01-26 PROCEDURE — 80061 LIPID PANEL: CPT

## 2024-01-26 PROCEDURE — 83036 HEMOGLOBIN GLYCOSYLATED A1C: CPT

## 2024-01-26 PROCEDURE — 36415 COLL VENOUS BLD VENIPUNCTURE: CPT

## 2024-01-31 DIAGNOSIS — I10 PRIMARY HYPERTENSION: ICD-10-CM

## 2024-01-31 RX ORDER — AMLODIPINE BESYLATE 5 MG/1
5 TABLET ORAL DAILY
Qty: 30 TABLET | Refills: 0 | Status: SHIPPED | OUTPATIENT
Start: 2024-01-31 | End: 2024-02-01

## 2024-01-31 NOTE — TELEPHONE ENCOUNTER
Received request via: Pharmacy    Was the patient seen in the last year in this department? Yes    Does the patient have an active prescription (recently filled or refills available) for medication(s) requested?  yes    Pharm[facility]    Does the patient have [facility] and need 100 day supply (blood pressure, diabetes and cholesterol meds only)? Patient does not have SCP

## 2024-02-01 ENCOUNTER — OFFICE VISIT (OUTPATIENT)
Dept: INTERNAL MEDICINE | Facility: OTHER | Age: 49
End: 2024-02-01
Payer: COMMERCIAL

## 2024-02-01 VITALS
WEIGHT: 202.2 LBS | BODY MASS INDEX: 34.52 KG/M2 | HEIGHT: 64 IN | SYSTOLIC BLOOD PRESSURE: 164 MMHG | OXYGEN SATURATION: 95 % | DIASTOLIC BLOOD PRESSURE: 99 MMHG | TEMPERATURE: 97.7 F | HEART RATE: 77 BPM

## 2024-02-01 DIAGNOSIS — M76.9 SEMIMEMBRANOSUS TENDINITIS: ICD-10-CM

## 2024-02-01 DIAGNOSIS — I10 PRIMARY HYPERTENSION: ICD-10-CM

## 2024-02-01 DIAGNOSIS — E78.2 MIXED HYPERLIPIDEMIA: ICD-10-CM

## 2024-02-01 DIAGNOSIS — G89.29 CHRONIC PAIN OF LEFT KNEE: ICD-10-CM

## 2024-02-01 DIAGNOSIS — E11.9 TYPE 2 DIABETES MELLITUS WITHOUT COMPLICATION, WITHOUT LONG-TERM CURRENT USE OF INSULIN (HCC): ICD-10-CM

## 2024-02-01 DIAGNOSIS — M25.562 CHRONIC PAIN OF LEFT KNEE: ICD-10-CM

## 2024-02-01 PROCEDURE — 3080F DIAST BP >= 90 MM HG: CPT

## 2024-02-01 PROCEDURE — 99214 OFFICE O/P EST MOD 30 MIN: CPT | Mod: GC

## 2024-02-01 PROCEDURE — 1125F AMNT PAIN NOTED PAIN PRSNT: CPT

## 2024-02-01 PROCEDURE — 3077F SYST BP >= 140 MM HG: CPT

## 2024-02-01 RX ORDER — METHOCARBAMOL 750 MG/1
750 TABLET, FILM COATED ORAL 3 TIMES DAILY PRN
Qty: 45 TABLET | Refills: 0 | Status: SHIPPED | OUTPATIENT
Start: 2024-02-01 | End: 2024-02-20

## 2024-02-01 RX ORDER — MELOXICAM 7.5 MG/1
7.5 TABLET ORAL DAILY
Qty: 30 TABLET | Refills: 0 | Status: SHIPPED | OUTPATIENT
Start: 2024-02-01 | End: 2024-02-28 | Stop reason: SDUPTHER

## 2024-02-01 RX ORDER — ATORVASTATIN CALCIUM 40 MG/1
40 TABLET, FILM COATED ORAL NIGHTLY
Qty: 90 TABLET | Refills: 0 | Status: SHIPPED | OUTPATIENT
Start: 2024-02-01

## 2024-02-01 RX ORDER — OLMESARTAN MEDOXOMIL 20 MG/1
20 TABLET ORAL DAILY
Qty: 90 TABLET | Refills: 0 | Status: SHIPPED | OUTPATIENT
Start: 2024-02-01

## 2024-02-01 ASSESSMENT — ENCOUNTER SYMPTOMS
RESPIRATORY NEGATIVE: 1
MYALGIAS: 1
GASTROINTESTINAL NEGATIVE: 1
NEUROLOGICAL NEGATIVE: 1
CARDIOVASCULAR NEGATIVE: 1
FEVER: 0
EYES NEGATIVE: 1
CHILLS: 0

## 2024-02-01 ASSESSMENT — PATIENT HEALTH QUESTIONNAIRE - PHQ9: CLINICAL INTERPRETATION OF PHQ2 SCORE: 0

## 2024-02-01 ASSESSMENT — PAIN SCALES - GENERAL: PAINLEVEL: 10=SEVERE PAIN

## 2024-02-01 NOTE — PROGRESS NOTES
Chief Complaint   Patient presents with    Leg Pain     Patient here for left leg pain -labs done at [facility]       HISTORY OF PRESENT ILLNESS: Patient is a 48 y.o. female established patient who presents today for the following.      Patient is a 48-year-old female with a past medical history diabetes, hypertension who presents for follow-up on left knee pain, lab review.  Patient was seen 6 weeks ago and at that time was complaining of tenderness on her semimembranosus tendon, denies any traumatic events at that time was instructed to do conservative therapy with stretches NSAIDs and physical therapy.  Patient states she was not able to go to physical therapy, has been taking NSAIDs without any relief.  Patient states she did go to a chiropractor 1 week after being seen and felt her pain had worsened and spread all through her knee below her knee and above her knee.  Patient denies any fevers or chills.  Patient also states she noticed she has been having bilateral leg swelling, states she has been compliant with her amlodipine however missed her dose today.      No past medical history on file.    Patient Active Problem List    Diagnosis Date Noted    Class 1 obesity in adult 12/21/2023    Semimembranosus tendinitis 12/21/2023    Primary hypertension 12/21/2023       Allergies: Patient has no known allergies.    Current Outpatient Medications   Medication Sig Dispense Refill    atorvastatin (LIPITOR) 40 MG Tab Take 1 Tablet by mouth every evening. 90 Tablet 0    olmesartan (BENICAR) 20 MG Tab Take 1 Tablet by mouth every day. 90 Tablet 0    methocarbamol (ROBAXIN) 750 MG Tab Take 1 Tablet by mouth 3 times a day as needed (for pain) for up to 14 days. 45 Tablet 0    meloxicam (MOBIC) 7.5 MG Tab Take 1 Tablet by mouth every day. 30 Tablet 0     No current facility-administered medications for this visit.       Social History     Tobacco Use    Smoking status: Every Day     Current packs/day: 0.10     Types:  "Cigarettes   Vaping Use    Vaping Use: Never used   Substance Use Topics    Alcohol use: No    Drug use: No       No family history on file.      Review of Systems   Review of Systems   Constitutional:  Negative for chills and fever.   Eyes: Negative.    Respiratory: Negative.     Cardiovascular: Negative.    Gastrointestinal: Negative.    Genitourinary: Negative.    Musculoskeletal:  Positive for joint pain and myalgias.   Neurological: Negative.        Exam:  BP (!) 164/99 (BP Location: Right arm, Patient Position: Sitting, BP Cuff Size: Large adult)   Pulse 77   Temp 36.5 °C (97.7 °F) (Temporal)   Ht 1.613 m (5' 3.5\")   Wt 91.7 kg (202 lb 3.2 oz)   SpO2 95%  Body mass index is 35.26 kg/m².    Constitutional:  Not in acute distress, well appearing.  HEENT:   NC/AT  Cardiovascular: Regular rate and rhythm. No murmurs or gallops.      Lungs:   Clear to auscultation bilaterally. No wheezes or crackles. No respiratory distress.  Abdomen: Not distended, soft, not tender. No guarding or rigidity. No masses.  Extremities:  No cyanosis/clubbing/edema. No obvious deformities.  Left knee: No swelling, no joint line tenderness, no warmth.  Tenderness present at semimembranosus tendon.   Skin:  Warm and dry.  No visible rashes.  Neurologic: Alert & oriented x 3, CN II-XII grossly intact, strength and sensation grossly intact.  No focal deficits noted.  Psychiatric:  Affect normal, mood normal, judgment normal.    Assessment/Plan:     1. Primary hypertension  Due to side effects of amlodipine and newly diagnosed type 2 diabetes we will switch patient to an ARB.  - olmesartan (BENICAR) 20 MG Tab; Take 1 Tablet by mouth every day.  Dispense: 90 Tablet; Refill: 0    2. Semimembranosus tendinitis  Patient continues to have exquisite tenderness on her semimembranosus tendon, will refer to orthopedics for possible steroid injection.  Try patient on muscle relaxer  - Referral to Orthopedics  - methocarbamol (ROBAXIN) 750 MG Tab; " Take 1 Tablet by mouth 3 times a day as needed (for pain) for up to 14 days.  Dispense: 45 Tablet; Refill: 0  - meloxicam (MOBIC) 7.5 MG Tab; Take 1 Tablet by mouth every day.  Dispense: 30 Tablet; Refill: 0    3. Chronic pain of left knee  Patient has been having 6 weeks of worsening knee pain, patient does states she feels unstable on her feet at times and using a brace for assistance.  Possible ligamentous or tendon involvement based off patient's symptoms and no improvement.  She did have an x-ray last month which demonstrated Tricompartmental degenerative changes, severe in the patellofemoral compartment.   - Referral to Orthopedics  - methocarbamol (ROBAXIN) 750 MG Tab; Take 1 Tablet by mouth 3 times a day as needed (for pain) for up to 14 days.  Dispense: 45 Tablet; Refill: 0  - meloxicam (MOBIC) 7.5 MG Tab; Take 1 Tablet by mouth every day.  Dispense: 30 Tablet; Refill: 0    4. Mixed hyperlipidemia  LDL elevated at 122  - atorvastatin (LIPITOR) 40 MG Tab; Take 1 Tablet by mouth every evening.  Dispense: 90 Tablet; Refill: 0    5. Type 2 diabetes mellitus without complication, without long-term current use of insulin ([facility])  Newly diagnosed type 2 diabetes recent A1c of 6.5.  Will discuss more at future visit, start patient on high intensity statin and ARB.  - atorvastatin (LIPITOR) 40 MG Tab; Take 1 Tablet by mouth every evening.  Dispense: 90 Tablet; Refill: 0      All imaging results and lab results and consult notes are reviewed at this visit.  Followup: Return in about 4 weeks (around 2/29/2024).    Please note that this dictation was created using voice recognition software. I have made every reasonable attempt to correct obvious errors, but I expect that there are errors of grammar and possibly content that I did not discover before finalizing the note.    Ministerio Ball, DO  PGY-2  Internal Medicine

## 2024-02-17 DIAGNOSIS — M25.562 CHRONIC PAIN OF LEFT KNEE: ICD-10-CM

## 2024-02-17 DIAGNOSIS — M76.9 SEMIMEMBRANOSUS TENDINITIS: ICD-10-CM

## 2024-02-17 DIAGNOSIS — G89.29 CHRONIC PAIN OF LEFT KNEE: ICD-10-CM

## 2024-02-20 RX ORDER — METHOCARBAMOL 750 MG/1
750 TABLET, FILM COATED ORAL 3 TIMES DAILY PRN
Qty: 45 TABLET | Refills: 0 | Status: SHIPPED | OUTPATIENT
Start: 2024-02-20 | End: 2024-03-12

## 2024-02-20 NOTE — TELEPHONE ENCOUNTER
Received request via: Pharmacy    Was the patient seen in the last year in this department? Yes    Does the patient have an active prescription (recently filled or refills available) for medication(s) requested? No    Pharmacy Name: [facility]/pharmacy #[id number] - [place], [place] - [address]     Does the patient have group home Plus and need 100 day supply (blood pressure, diabetes and cholesterol meds only)? Patient does not have SCP

## 2024-02-28 DIAGNOSIS — M25.562 CHRONIC PAIN OF LEFT KNEE: ICD-10-CM

## 2024-02-28 DIAGNOSIS — G89.29 CHRONIC PAIN OF LEFT KNEE: ICD-10-CM

## 2024-02-28 DIAGNOSIS — M76.9 SEMIMEMBRANOSUS TENDINITIS: ICD-10-CM

## 2024-02-28 RX ORDER — MELOXICAM 7.5 MG/1
7.5 TABLET ORAL DAILY
Qty: 90 TABLET | Refills: 0 | Status: SHIPPED | OUTPATIENT
Start: 2024-02-28

## 2024-02-28 NOTE — TELEPHONE ENCOUNTER
Received request via: Pharmacy    Was the patient seen in the last year in this department? Yes    Does the patient have an active prescription (recently filled or refills available) for medication(s) requested?  yes    Pharmacy Name: [facility]    Does the patient have snf Plus and need 100 day supply (blood pressure, diabetes and cholesterol meds only)? Patient does not have SCP

## 2024-03-08 DIAGNOSIS — M25.562 CHRONIC PAIN OF LEFT KNEE: ICD-10-CM

## 2024-03-08 DIAGNOSIS — G89.29 CHRONIC PAIN OF LEFT KNEE: ICD-10-CM

## 2024-03-08 DIAGNOSIS — M76.9 SEMIMEMBRANOSUS TENDINITIS: ICD-10-CM

## 2024-03-08 NOTE — TELEPHONE ENCOUNTER
Received request via: Pharmacy    Was the patient seen in the last year in this department? Yes    Does the patient have an active prescription (recently filled or refills available) for medication(s) requested?  yes    Pharmacy Name: [facility]    Does the patient have longterm Plus and need 100 day supply (blood pressure, diabetes and cholesterol meds only)? no

## 2024-03-12 RX ORDER — METHOCARBAMOL 750 MG/1
750 TABLET, FILM COATED ORAL 3 TIMES DAILY PRN
Qty: 45 TABLET | Refills: 0 | Status: SHIPPED | OUTPATIENT
Start: 2024-03-12 | End: 2024-03-26

## 2024-04-02 DIAGNOSIS — G89.29 CHRONIC PAIN OF LEFT KNEE: ICD-10-CM

## 2024-04-02 DIAGNOSIS — M25.562 CHRONIC PAIN OF LEFT KNEE: ICD-10-CM

## 2024-04-02 DIAGNOSIS — M76.9 SEMIMEMBRANOSUS TENDINITIS: ICD-10-CM

## 2024-04-02 NOTE — TELEPHONE ENCOUNTER
Received request via: Pharmacy    Was the patient seen in the last year in this department? Yes    Does the patient have an active prescription (recently filled or refills available) for medication(s) requested?  yes    Pharmacy Name: [facility]    Does the patient have [facility] Plus and need 100 day supply (blood pressure, diabetes and cholesterol meds only)? Patient does not have SCP

## 2024-04-03 RX ORDER — METHOCARBAMOL 750 MG/1
750 TABLET, FILM COATED ORAL 3 TIMES DAILY PRN
Qty: 45 TABLET | Refills: 0 | Status: SHIPPED | OUTPATIENT
Start: 2024-04-03 | End: 2024-04-17

## 2024-04-28 DIAGNOSIS — E11.9 TYPE 2 DIABETES MELLITUS WITHOUT COMPLICATION, WITHOUT LONG-TERM CURRENT USE OF INSULIN (HCC): ICD-10-CM

## 2024-04-28 DIAGNOSIS — I10 PRIMARY HYPERTENSION: ICD-10-CM

## 2024-04-28 DIAGNOSIS — E78.2 MIXED HYPERLIPIDEMIA: ICD-10-CM

## 2024-04-30 RX ORDER — OLMESARTAN MEDOXOMIL 20 MG/1
20 TABLET ORAL DAILY
Qty: 90 TABLET | Refills: 0 | Status: SHIPPED | OUTPATIENT
Start: 2024-04-30

## 2024-04-30 RX ORDER — ATORVASTATIN CALCIUM 40 MG/1
40 TABLET, FILM COATED ORAL EVERY EVENING
Qty: 90 TABLET | Refills: 0 | Status: SHIPPED | OUTPATIENT
Start: 2024-04-30

## 2024-05-02 ENCOUNTER — OFFICE VISIT (OUTPATIENT)
Dept: INTERNAL MEDICINE | Facility: OTHER | Age: 49
End: 2024-05-02
Payer: COMMERCIAL

## 2024-05-02 ENCOUNTER — HOSPITAL ENCOUNTER (OUTPATIENT)
Facility: MEDICAL CENTER | Age: 49
End: 2024-05-02
Payer: COMMERCIAL

## 2024-05-02 VITALS
DIASTOLIC BLOOD PRESSURE: 91 MMHG | HEART RATE: 68 BPM | OXYGEN SATURATION: 96 % | TEMPERATURE: 96.8 F | HEIGHT: 64 IN | WEIGHT: 194.2 LBS | SYSTOLIC BLOOD PRESSURE: 174 MMHG | BODY MASS INDEX: 33.16 KG/M2

## 2024-05-02 DIAGNOSIS — E78.2 MIXED HYPERLIPIDEMIA: ICD-10-CM

## 2024-05-02 DIAGNOSIS — M76.9 SEMIMEMBRANOSUS TENDINITIS: ICD-10-CM

## 2024-05-02 DIAGNOSIS — G89.29 CHRONIC PAIN OF LEFT KNEE: ICD-10-CM

## 2024-05-02 DIAGNOSIS — E11.9 TYPE 2 DIABETES MELLITUS WITHOUT COMPLICATION, WITHOUT LONG-TERM CURRENT USE OF INSULIN (HCC): ICD-10-CM

## 2024-05-02 DIAGNOSIS — M25.562 CHRONIC PAIN OF LEFT KNEE: ICD-10-CM

## 2024-05-02 DIAGNOSIS — I10 PRIMARY HYPERTENSION: ICD-10-CM

## 2024-05-02 LAB
HBA1C MFR BLD: 6.4 % (ref ?–5.8)
POCT INT CON NEG: NEGATIVE
POCT INT CON POS: POSITIVE

## 2024-05-02 PROCEDURE — 83036 HEMOGLOBIN GLYCOSYLATED A1C: CPT | Mod: QW,GC

## 2024-05-02 PROCEDURE — 99000 SPECIMEN HANDLING OFFICE-LAB: CPT | Mod: GC

## 2024-05-02 PROCEDURE — 3077F SYST BP >= 140 MM HG: CPT | Mod: GC

## 2024-05-02 PROCEDURE — 3080F DIAST BP >= 90 MM HG: CPT | Mod: GC

## 2024-05-02 PROCEDURE — 99214 OFFICE O/P EST MOD 30 MIN: CPT | Mod: GC

## 2024-05-02 RX ORDER — OLMESARTAN MEDOXOMIL 40 MG/1
40 TABLET ORAL DAILY
Qty: 90 TABLET | Refills: 0 | Status: SHIPPED | OUTPATIENT
Start: 2024-05-02

## 2024-05-02 RX ORDER — ATORVASTATIN CALCIUM 40 MG/1
40 TABLET, FILM COATED ORAL EVERY EVENING
Qty: 90 TABLET | Refills: 0 | Status: SHIPPED | OUTPATIENT
Start: 2024-05-02

## 2024-05-02 RX ORDER — MELOXICAM 7.5 MG/1
7.5 TABLET ORAL DAILY
Qty: 30 TABLET | Refills: 0 | Status: SHIPPED | OUTPATIENT
Start: 2024-05-02

## 2024-05-02 ASSESSMENT — ENCOUNTER SYMPTOMS
HEADACHES: 0
SPUTUM PRODUCTION: 0
FEVER: 0
SORE THROAT: 0
DOUBLE VISION: 0
STRIDOR: 0
SHORTNESS OF BREATH: 0
ABDOMINAL PAIN: 0
PALPITATIONS: 0
NECK PAIN: 0
CHILLS: 0
DIZZINESS: 0
WHEEZING: 0
COUGH: 0
BLURRED VISION: 0
MYALGIAS: 0
DEPRESSION: 0
DIARRHEA: 0
VOMITING: 0

## 2024-05-02 ASSESSMENT — PAIN SCALES - GENERAL: PAINLEVEL: 6=MODERATE PAIN

## 2024-05-02 NOTE — PATIENT INSTRUCTIONS
Please follow up for your labs before your next visit.  Thank you for visiting me today at the [facility].   Please follow up in 3 months.

## 2024-05-02 NOTE — PROGRESS NOTES
Chief Complaint   Patient presents with    Hypertension     Patient for htn follow up       HISTORY OF PRESENT ILLNESS: Patient is a 48 y.o. female established patient who presents today for the following.      Patient is a 48-year-old female with past medical history of type 2 diabetes, hypertension, hyperlipidemia presents to the for follow-up regarding her left knee pain, semimembranosus tendinitis which she states has improved with conservative care and medication states she is able to stretch her knee without any pain anymore however it still feels tight when stretching, is using as needed Mobic and Robaxin for pain.  States she has been compliant with her blood pressure medication however missed her morning dose.  As reported by her son her home systolics have been ranging around the 150s.  In regards to her diet she does state that she drinks plenty of Coca-Cola, Pepsi, energy drinks, minimal exercise, works at [employer] is on her feet all day.  Denies any chest pain, shortness of breath at today's visit.      No past medical history on file.    Patient Active Problem List    Diagnosis Date Noted    Class 1 obesity in adult 12/21/2023    Semimembranosus tendinitis 12/21/2023    Primary hypertension 12/21/2023       Allergies: Patient has no known allergies.    Current Outpatient Medications   Medication Sig Dispense Refill    olmesartan (BENICAR) 40 MG Tab Take 1 Tablet by mouth every day. 90 Tablet 0    atorvastatin (LIPITOR) 40 MG Tab Take 1 Tablet by mouth every evening. 90 Tablet 0    meloxicam (MOBIC) 7.5 MG Tab Take 1 Tablet by mouth every day. 30 Tablet 0    metFORMIN (GLUCOPHAGE) 500 MG Tab Take 1 Tablet by mouth every day. 90 Tablet 0     No current facility-administered medications for this visit.       Social History     Tobacco Use    Smoking status: Every Day     Current packs/day: 0.10     Types: Cigarettes   Vaping Use    Vaping Use: Never used   Substance Use Topics    Alcohol use: No     "Drug use: No       No family history on file.      Review of Systems   Review of Systems   Constitutional:  Negative for chills and fever.   HENT:  Negative for sore throat.    Eyes:  Negative for blurred vision and double vision.   Respiratory:  Negative for cough, sputum production, shortness of breath, wheezing and stridor.    Cardiovascular:  Negative for chest pain and palpitations.   Gastrointestinal:  Negative for abdominal pain, diarrhea and vomiting.   Genitourinary:  Negative for dysuria and urgency.   Musculoskeletal:  Positive for joint pain. Negative for myalgias and neck pain.   Neurological:  Negative for dizziness and headaches.   Psychiatric/Behavioral:  Negative for depression.        Exam:  BP (!) 174/91 (BP Location: Left arm, Patient Position: Sitting, BP Cuff Size: Large adult)   Pulse 68   Temp 36 °C (96.8 °F) (Temporal)   Ht 1.613 m (5' 3.5\")   Wt 88.1 kg (194 lb 3.2 oz)   SpO2 96%  Body mass index is 33.86 kg/m².    Constitutional:  Not in acute distress, well appearing.  HEENT:   NC/AT  Cardiovascular: Regular rate and rhythm. No murmurs or gallops.      Lungs:   Clear to auscultation bilaterally. No wheezes or crackles. No respiratory distress.  Abdomen: Not distended, soft, not tender. No guarding or rigidity. No masses.  Extremities:  No cyanosis/clubbing/edema. No obvious deformities.  Left knee: Crepitus present, mild tenderness at joint line, no swelling present  Skin:  Warm and dry.  No visible rashes.  Neurologic: Alert & oriented x 3, CN II-XII grossly intact, strength and sensation grossly intact.  No focal deficits noted.  Psychiatric:  Affect normal, mood normal, judgment normal.    Assessment/Plan:     1. Primary hypertension  Persistently elevated home blood pressures on olmesartan 20 will titrate up to olmesartan 40 mg a repeat BMP in 4 to 6 weeks to monitor creatinine.  - olmesartan (BENICAR) 40 MG Tab; Take 1 Tablet by mouth every day.  Dispense: 90 Tablet; Refill: " 0  - Basic Metabolic Panel; Future    2. Type 2 diabetes mellitus without complication, without long-term current use of insulin (HCC)  Last HbA1c 6.5, Will start patient on low-dose metformin, obtain microalbumin creatinine ratio, refer to nutrition services  -Discussed importance of diet, agreeable to nutrition consult  - POCT  A1C  - MICROALBUMIN CREAT RATIO URINE; Future  - atorvastatin (LIPITOR) 40 MG Tab; Take 1 Tablet by mouth every evening.  Dispense: 90 Tablet; Refill: 0  - Referral to Nutrition Services  -Metformin 500 mg daily  3. Semimembranosus tendinitis  Improving with conservative care, will refer to physical therapy  - Referral to Physical Therapy  - meloxicam (MOBIC) 7.5 MG Tab; Take 1 Tablet by mouth every day.  Dispense: 30 Tablet; Refill: 0    4. Mixed hyperlipidemia  ASCVD score of 15.8%  - atorvastatin (LIPITOR) 40 MG Tab; Take 1 Tablet by mouth every evening.  Dispense: 90 Tablet; Refill: 0    5. Chronic pain of left knee  Previous x-ray demonstrating Tricompartmental degenerative changes, severe in the patellofemoral compartment.  Physical exam with crepitus however no erythema or tenderness.  - meloxicam (MOBIC) 7.5 MG Tab; Take 1 Tablet by mouth every day.  Dispense: 30 Tablet; Refill: 0      All imaging results and lab results and consult notes are reviewed at this visit.  Followup: Return in about 3 months (around 8/2/2024).    Please note that this dictation was created using voice recognition software. I have made every reasonable attempt to correct obvious errors, but I expect that there are errors of grammar and possibly content that I did not discover before finalizing the note.    Ministerio Ball, DO  PGY-2  Internal Medicine

## 2024-05-03 LAB
CREAT UR-MCNC: 155.17 MG/DL
MICROALBUMIN UR-MCNC: 1.8 MG/DL
MICROALBUMIN/CREAT UR: 12 MG/G (ref 0–30)

## 2024-07-27 DIAGNOSIS — E11.9 TYPE 2 DIABETES MELLITUS WITHOUT COMPLICATION, WITHOUT LONG-TERM CURRENT USE OF INSULIN (HCC): ICD-10-CM

## 2024-07-28 DIAGNOSIS — I10 PRIMARY HYPERTENSION: ICD-10-CM

## 2024-07-31 RX ORDER — OLMESARTAN MEDOXOMIL 40 MG/1
40 TABLET ORAL DAILY
Qty: 90 TABLET | Refills: 0 | Status: SHIPPED | OUTPATIENT
Start: 2024-07-31